# Patient Record
Sex: MALE | Race: WHITE | Employment: OTHER | URBAN - METROPOLITAN AREA
[De-identification: names, ages, dates, MRNs, and addresses within clinical notes are randomized per-mention and may not be internally consistent; named-entity substitution may affect disease eponyms.]

---

## 2017-07-12 PROBLEM — R73.01 IMPAIRED FASTING GLUCOSE: Status: ACTIVE | Noted: 2017-07-12

## 2018-03-24 ENCOUNTER — HOSPITAL ENCOUNTER (OUTPATIENT)
Dept: MRI IMAGING | Age: 73
Discharge: HOME | End: 2018-03-24
Attending: FAMILY MEDICINE
Payer: MEDICARE

## 2018-03-24 DIAGNOSIS — M54.50 CHRONIC BILATERAL LOW BACK PAIN WITHOUT SCIATICA: ICD-10-CM

## 2018-03-24 DIAGNOSIS — G89.29 CHRONIC BILATERAL LOW BACK PAIN WITHOUT SCIATICA: ICD-10-CM

## 2018-03-24 PROCEDURE — 72148 MRI LUMBAR SPINE W/O DYE: CPT

## 2018-07-05 PROBLEM — M51.36 DDD (DEGENERATIVE DISC DISEASE), LUMBAR: Status: ACTIVE | Noted: 2018-07-05

## 2018-07-05 PROBLEM — G89.4 CHRONIC PAIN SYNDROME: Status: ACTIVE | Noted: 2018-07-05

## 2018-07-18 PROBLEM — R10.13 DYSPEPSIA: Status: ACTIVE | Noted: 2018-07-18

## 2018-12-05 ENCOUNTER — HOSPITAL ENCOUNTER (OUTPATIENT)
Dept: LAB | Age: 73
Discharge: HOME | End: 2018-12-05

## 2018-12-05 PROCEDURE — 88305 TISSUE EXAM BY PATHOLOGIST: CPT

## 2019-11-21 ENCOUNTER — HOSPITAL ENCOUNTER (OUTPATIENT)
Dept: ULTRASOUND IMAGING | Age: 74
Discharge: HOME | End: 2019-11-21
Attending: FAMILY MEDICINE

## 2019-11-21 DIAGNOSIS — M79.604 RIGHT LEG PAIN: ICD-10-CM

## 2020-07-28 ENCOUNTER — HOSPITAL ENCOUNTER (OUTPATIENT)
Dept: MRI IMAGING | Age: 75
Discharge: HOME | End: 2020-07-28
Attending: ANESTHESIOLOGY
Payer: MEDICARE

## 2020-07-28 DIAGNOSIS — M54.50 LOW BACK PAIN: ICD-10-CM

## 2020-07-28 DIAGNOSIS — M54.10 RADICULOPATHY: ICD-10-CM

## 2020-07-28 PROCEDURE — 72148 MRI LUMBAR SPINE W/O DYE: CPT

## 2020-08-20 PROBLEM — M53.86 SCIATICA ASSOCIATED WITH DISORDER OF LUMBAR SPINE: Status: ACTIVE | Noted: 2020-08-20

## 2021-07-01 ENCOUNTER — HOSPITAL ENCOUNTER (OUTPATIENT)
Dept: PHYSICAL THERAPY | Age: 76
Discharge: HOME | End: 2021-07-01
Payer: MEDICARE

## 2021-07-01 DIAGNOSIS — M17.11 PRIMARY OSTEOARTHRITIS OF RIGHT KNEE: ICD-10-CM

## 2021-07-01 PROCEDURE — 97110 THERAPEUTIC EXERCISES: CPT

## 2021-07-01 PROCEDURE — 97161 PT EVAL LOW COMPLEX 20 MIN: CPT

## 2021-07-01 NOTE — PROGRESS NOTES
Roi Moctezuma II  : 1945  Primary: Calixto Barrett Medicare Advantage  Secondary:  [address] at [facility]  [address], [address], [place] [address]  [phone]   [phone]                                  OUTPATIENT PHYSICAL THERAPY: Daily Treatment Note 2021  Visit Count:  1  ICD-10: Treatment Diagnosis: unilateral primary OA right knee (M17.11)  Pain in joint, right knee (M25.561)  Precautions/Allergies:   Codeine, Lisinopril, and Ultram [tramadol]   TREATMENT PLAN:  Effective Dates: 2021 TO 2021 (30 days). Frequency/Duration: 2 times a week for 30 Day(s)     MEDICAL/REFERRING DIAGNOSIS:  Primary osteoarthritis of right knee [M17.11]   DATE OF ONSET: chronic  REFERRING PHYSICIAN: Jesús Pedro MD MD Orders: Ovidio Rosado and Treat  Return MD Appointment: 21     Pre-treatment Symptoms/Complaints:  Rio Moctezuma II reports chronic stiffness and pain in right knee. Possible right TKA 21 so we will need to mindful of Medicare. Pain: Initial: Pain Intensity 1: 6 (1 at rest   6 with walking)  Pain Location 1: Knee  Pain Orientation 1: Right  Post Session:  6/10   Medications Last Reviewed:  2021  Updated Objective Findings:     TREATMENT:   THERAPEUTIC EXERCISE: (15 minutes):  Exercises per grid below to improve mobility, strength and coordination. Required minimal verbal cues to promote proper body mechanics. Progressed resistance, range and repetitions as indicated. Access Code: [email or web address]  URL: [email or web address]  Date: 2021  Prepared by:  Jaime Alatorre    Exercises  Supine Quad Set - 2 x daily - 10 reps - 5 hold  Supine Hip Adduction Isometric with Ball - 2 x daily - 10 reps - 5 hold  Supine Active Straight Leg Raise - 2 x daily - 10 reps  Supine Hamstring Stretch - 2 x daily - 1 sets - 10 reps  Sidelying Hip Abduction - 2 x daily - 10 reps - 5 hold     Date:  7-1-21 Date:   Date:     Activity/Exercise Parameters Parameters Parameters   HEP As above                                           Treatment/Session Summary:    · Response to Treatment: Dariana Briseyda II demonstrates good understanding of initial HEP. Also instructed in supine piriformis for left sided sciatica  · Communication/Consultation:  HEP  · Equipment provided today:  instruction sheet  · Recommendations/Intent for next treatment session: Next visit will focus on progressive right knee stabilization. .    Total Treatment Billable Duration:  15 min therex  PT Patient Time In/Time Out  Time In: 1000  Time Out: 4800 \[facility] [place] [facility]\"", PT    Future Appointments   Date Time Provider Jordan Dulce   7/7/2021  1:45 PM Raya Price, PT, DPT [facility]   7/9/2021  8:00 AM Juan Pablo Schmid, PT, DPT [facility]   7/12/2021  9:30 AM Juan Pablo Werner, PT, DPT [facility]   7/14/2021  9:45 AM Raya Price PT, DPT [facility]   7/19/2021  1:45 PM Geralynn Capes, PT [facility]   7/21/2021 10:30 AM Geralynn Capes, PT [facility]   7/26/2021  1:45 PM Geralynn Capes, PT [facility]   7/28/2021 10:30 AM Geralynn Capes, PT [facility]   8/2/2021 10:30 AM Geralynn Capes, PT [facility]   8/4/2021 11:15 AM Geralynn Capes, PT [facility]   5/18/2022  9:40 AM Tanesha Arechiga, GEMA [facility]

## 2021-07-02 NOTE — THERAPY EVALUATION
Mikel Tatum II  : 1945  Primary: Bea Pichardo Medicare Advantage  Secondary:  [address]  at [facility]  [address] , [address], [place] [address]  [phone]   [phone]        OUTPATIENT PHYSICAL THERAPY:Initial Assessment 2021    ICD-10: Treatment Diagnosis: unilateral primary OA right knee (M17.11)  Pain in joint, right knee (M25.561)  Precautions/Allergies:   Codeine, Lisinopril, and Ultram [tramadol]   TREATMENT PLAN:  Effective Dates: 2021 TO 2021 (30 days). Frequency/Duration: 2 times a week for 30 Day(s)     MEDICAL/REFERRING DIAGNOSIS:  Primary osteoarthritis of right knee [M17.11]   DATE OF ONSET: chronic  REFERRING PHYSICIAN: Rom Villalobos MD MD Orders: Merrill Cantu and Treat  Return MD Appointment: 21     INITIAL ASSESSMENT:  Mr. Steven Tatum presents with complaints of chronic pain in his right knee. He states that walking and stair climbing are becoming progressively difficult. Pain improves with rest. Patient is scheduled for probable right TKA 21. Patient does note that he was diagnosed with COVID 19 in February, and was hospitalized 3 days. He noted significant decrease in leg strength after discharge, and reports \"melting\" to the floor x 4 over 2 days due to leg weakness. Pt may benefit from PT to address the following problem list.   PROBLEM LIST (Impacting functional limitations):  1. Decreased Strength  2. Decreased ADL/Functional Activities  3. Increased Pain  4. Decreased Flexibility/Joint Mobility INTERVENTIONS PLANNED:  1. Cryotherapy  2. Electrical Stimulation  3. Home Exercise Program (HEP)  4. Manual Therapy  5. Therapeutic Exercise/Strengthening     GOALS: (Goals have been discussed and agreed upon with patient.)  Short-Term Functional Goals: Time Frame: 2 weeks  1. Independent in initial HEP  2. Decrease pain with walking to < 5/10  Discharge Goals: Time Frame: 4 weeks  1. Independent in advanced HEP  2.  Decrease pain at rest to 1/10  3. Maintain full extension right knee    NOTE: Patient scheduled for TKA 8-9-21. Please monitor Medicare usage. CLINICAL DECISION MAKING:   Outcome Measure: Tool Used: Lower Extremity Functional Scale (LEFS)  Score:  Initial: 34/80   7-1-21 Most Recent: X/80 (Date: -- )   Interpretation of Score: 20 questions each scored on a 5 point scale with 0 representing \"extreme difficulty or unable to perform\" and 4 representing \"no difficulty\". The lower the score, the greater the functional disability. 80/80 represents no disability. Minimal detectable change is 9 points. Medical Necessity:   · Patient demonstrates good rehab potential due to higher previous functional level. Reason for Services/Other Comments:  · Patient continues to require modification of therapeutic interventions to increase complexity of exercises. PT Patient Time In/Time Out  Time In: 1000  Time Out: 1045  Rehabilitation Potential For Stated Goals: Good  Regarding Pengilly Rula BERRIOS's therapy, I certify that the treatment plan above will be carried out by a therapist or under their direction. Thank you for this referral,  Iram Romero, PT                 The information in this section was collected on 7-1-21 (except where otherwise noted). HISTORY:   History of Present Injury/Illness (Reason for Referral):  Chronic right knee pain  Past Medical History/Comorbidities:   Mr. Mani Reyes  has a past medical history of Cervicalgia (5/14/2013), Chronic LBP (9/25/2013), CKD (chronic kidney disease) stage 3, GFR 30-59 ml/min ([facility]) (4/12/2016), Combined hyperlipidemia, DDD (degenerative disc disease), cervical, Dysmetabolic syndrome X (5/53/9140), GERD (gastroesophageal reflux disease), GERD (gastroesophageal reflux disease), Gout (5/14/2013), HTN (hypertension), Low HDL (under 40), Migraine, NAFLD (nonalcoholic fatty liver disease), Obesity (5/14/2013), PRATIBHA (obstructive sleep apnea), and Prediabetes (2012).    Bradley Mcintosh  has a past surgical history that includes hx cervical diskectomy (2004); hx lap cholecystectomy (2005); hx tonsillectomy (1951); hx cholecystectomy (2005); hx colonoscopy; hx endoscopy (2004); and hx lumbar laminectomy (1/2015). Social History/Living Environment:     denies barriers  Prior Level of Function/Work/Activity:  Works part time  Dominant Side:         RIGHT     Ambulatory/Rehab [facility] Risk Assessment    Risk Factors:       (1)  Gender [Male] Ability to Rise from Chair:       (1)  Pushes up, successful in one attempt    Falls Prevention Plan:       No modifications necessary   Total: (5 or greater = High Risk): 2    ©2010 [facility] [address] [place] [address] Patent #[id number]. Federal Law prohibits the replication, distribution or use without written permission from [facility] [employer]       Current Medications:       Current Outpatient Medications:     pantoprazole (PROTONIX) 40 mg tablet, 1 cap po qd, Disp: 90 Tab, Rfl: 1    HYDROcodone-acetaminophen (NORCO) 5-325 mg per tablet, 1 tab po bid prn pain, Disp: 60 Tab, Rfl: 0    HYDROcodone-acetaminophen (NORCO) 5-325 mg per tablet, 1 tab po bid prn pain, Disp: 60 Tab, Rfl: 0    torsemide (DEMADEX) 10 mg tablet, 1 tab po qam prn edema, Disp: 90 Tab, Rfl: 3    allopurinol (ZYLOPRIM) 300 mg tablet, 1 tab po qd, Disp: 90 Tab, Rfl: 1    gabapentin (NEURONTIN) 300 mg capsule, Take 1 Cap by mouth three (3) times daily. , Disp: 270 Cap, Rfl: 1    losartan (COZAAR) 100 mg tablet, TAKE 1 TABLET BY MOUTH EVERY DAY, Disp: 90 Tab, Rfl: 1    potassium chloride (KLOR-CON) 10 mEq tablet, Take 2 Tabs by mouth daily. , Disp: 180 Tab, Rfl: 1    HYDROcodone-acetaminophen (NORCO) 5-325 mg per tablet, Take 1 Tab by mouth two (2) times daily as needed for Pain.  Max Daily Amount: 2 Tabs., Disp: 60 Tab, Rfl: 0    atorvastatin (LIPITOR) 10 mg tablet, Take 1 Tab by mouth nightly., Disp: 90 Tab, Rfl: 1    doxazosin (CARDURA) 4 mg tablet, Take 1 Tab by mouth nightly. (Patient not taking: Reported on 5/20/2021), Disp: 90 Tab, Rfl: 1    ibuprofen (MOTRIN) 200 mg tablet, Take 800 mg by mouth every eight (8) hours as needed for Pain. (Patient not taking: Reported on 5/20/2021), Disp: , Rfl:     Omega-3 Fatty Acids 60- mg cpDR, Take  by mouth., Disp: , Rfl:     coenzyme q10 10 mg cap, Take  by mouth., Disp: , Rfl:     cpap machine kit, by Does Not Apply route. 13 cm with water, Disp: , Rfl:     OTHER, (Compounded Cream) Gabapentin 4%, Topiramate 2.5%, Lidocaine 2%, Prilocaine 2%,  Apply 1-2 pumps of pain cream to faocal pain area 2 times daily, Disp: , Rfl:     aspirin delayed-release 81 mg tablet, Take 81 mg by mouth daily. Last dose 12/28/14  Indications: MYOCARDIAL INFARCTION PREVENTION (Patient not taking: Reported on 5/20/2021), Disp: , Rfl:     KRILL OIL PO, Take 1 tablet by mouth daily. Last dose 12/29/14, Disp: , Rfl:     cetirizine (ZYRTEC) 10 mg tablet, Take  by mouth every morning. Indications: ALLERGIC RHINITIS, Disp: , Rfl:     multivitamin (ONE A DAY) tablet, Take 1 tablet by mouth daily. Last dose 12/29/14  Indications: VITAMIN DEFICIENCY PREVENTION, Disp: , Rfl:    Date Last Reviewed:  7/1/2021   Number of Personal Factors/Comorbidities that affect the Plan of Care: 0: LOW COMPLEXITY   EXAMINATION:   Observation/Orthostatic Postural Assessment:          Mild edema right knee  Palpation:          Moderate tenderness medial joint line right knee  ROM:            Knee ROM  DATE  7-1-21 DATE     flexion  R:125  L:128 R:  L:   extension R:-2  L:0 R:  L:       Strength:            Knee strength  DATE  7-1-21 DATE     flexion  R:4+  L:4+ R:  L:   extension R:4+  L:% R:  L:      Body Structures Involved:  1. Bones  2. Joints  3. Muscles Body Functions Affected:  1. Movement Related Activities and Participation Affected:  1.  General Tasks and Demands   Number of elements (examined above) that affect the Plan of Care: 1-2: LOW COMPLEXITY   CLINICAL PRESENTATION:   Presentation: Stable and uncomplicated: LOW COMPLEXITY      Use of outcome tool(s) and clinical judgement create a POC that gives a: Clear prediction of patient's progress: LOW COMPLEXITY                    Treatment/Session Assessment:    · Response to Treatment:  Pt reports understanding of and agreement with treatment plan. · Compliance with Program/Exercises: Will assess as treatment progresses. · Recommendations/Intent for next treatment session: \"Next visit will focus on advancements to more challenging activities\". Patient is initially scheduled for 2 x week. If progressing well, may decrease frequency to 1 x week. Future Appointments   Date Time Provider Jordan Cardona   7/7/2021  1:45 PM Bill Nick, PT, DPT [facility]   7/9/2021  8:00 AM Cyndi Schmid, PT, DPT [facility]   7/12/2021  9:30 AM Arletzhen Marti, PT, DPT [facility]   7/14/2021  9:45 AM Bill Nick, PT, DPT [facility]   7/19/2021  1:45 PM Jeannetta Inocencia, PT [facility]   7/21/2021 10:30 AM Jeannetta Inocencia, PT [facility]   7/26/2021  1:45 PM Jeannetta Inocencia, PT [facility]   7/28/2021 10:30 AM Jeannetta Inocencia, PT [facility]   8/2/2021 10:30 AM Jeannetta Inocencia, PT [facility]   8/4/2021 11:15 AM Jeannetta Inocencia, PT [facility]   5/18/2022  9:40 AM Carmen Arechiga, GEMA [facility]     Please explain any variance from Plan of Care.   Total Treatment Duration: 20 min debra Sequeira, PT

## 2021-07-07 ENCOUNTER — HOSPITAL ENCOUNTER (OUTPATIENT)
Dept: PHYSICAL THERAPY | Age: 76
Discharge: HOME | End: 2021-07-07
Payer: MEDICARE

## 2021-07-07 PROCEDURE — 97110 THERAPEUTIC EXERCISES: CPT

## 2021-07-12 ENCOUNTER — HOSPITAL ENCOUNTER (OUTPATIENT)
Dept: PHYSICAL THERAPY | Age: 76
Discharge: HOME | End: 2021-07-12
Payer: MEDICARE

## 2021-07-12 PROCEDURE — 97016 VASOPNEUMATIC DEVICE THERAPY: CPT

## 2021-07-12 PROCEDURE — 97110 THERAPEUTIC EXERCISES: CPT

## 2021-07-12 NOTE — PROGRESS NOTES
Andria Moore [facility] Deepti   : 1945  Primary: Travis Knutson Medicare Advantage  Secondary:  [address]  at [facility]  [address], [address], [place] [address]  [phone]   [phone]                                  OUTPATIENT PHYSICAL THERAPY: Daily Treatment Note 2021  Visit Count:  3  ICD-10: Treatment Diagnosis: unilateral primary OA right knee (M17.11)  Pain in joint, right knee (M25.561)  Precautions/Allergies:   Codeine, Lisinopril, and Ultram [tramadol]   TREATMENT PLAN:  Effective Dates: 2021 TO 2021 (30 days). Frequency/Duration: 2 times a week for 30 Day(s)     MEDICAL/REFERRING DIAGNOSIS:  Primary osteoarthritis of right knee [M17.11]   DATE OF ONSET: chronic  REFERRING PHYSICIAN: Que Godoy MD MD Orders: Mac Solon and Treat  Return MD Appointment: 21     Pre-treatment Symptoms/Complaints:  Kevin Massey II reported to PT with minimal R knee pain. Notes that he is able to avoid most aggravating activities with exception of donning shoes. Pain: Initial:    Post Session:  1/10   Medications Last Reviewed:  2021  Updated Objective Findings:  None today   TREATMENT:   THERAPEUTIC EXERCISE: (45 minutes):  Exercises per grid below to improve mobility, strength and coordination. Required minimal verbal cues to promote proper body mechanics. Progressed resistance, range and repetitions as indicated. Access Code: [email or web address]  URL: [email or web address]  Date: 2021  Prepared by:  Jaime Alatorre    Exercises  Supine Quad Set - 2 x daily - 10 reps - 5 hold  Supine Hip Adduction Isometric with Ball - 2 x daily - 10 reps - 5 hold  Supine Active Straight Leg Raise - 2 x daily - 10 reps  Supine Hamstring Stretch - 2 x daily - 1 sets - 10 reps  Sidelying Hip Abduction - 2 x daily - 10 reps - 5 hold     Date:  21 Date:  21 Date:  21 Activity/Exercise Parameters Parameters Parameters   HEP As above  Just supine quad set   SAQ  10x, 3 sets, 5# 10x, 3 sets, 5#   Heel slides  10x, 3 sets, manual resistance in push and pull 10x, 3 sets, manual resistance in push and pull   Shuttle  10x: 1 set 100 lbs, 2 [id number]# Bilateral 3 X 10, 125#; Unilateral RLE 2 X 10, 75#    Heel Raises  10x, 3 sets, low box    Nustep  7' L1 8' L2   Hamstring Curls   Standing, RLE 3 X 10, 5#   LAQ   RLE 3 X 10, 5#   Sit to Stand   3 X 10   Step ups   Front and side; 3 X 10 each - mid sized step   Hip Abduction   Standing, RLE 3 X 10     Modalities: 15 min Game Ready R knee    Treatment/Session Summary:    · Response to Treatment: Elaine Hugo II tolerated treatment well with minimal symptomatic pain. He began to feel tenderness in the inferior pole of the R patella w/ quad sets and with front step ups. · Communication/Consultation:    · Equipment provided today:    · Recommendations/Intent for next treatment session: Next visit will focus on progressive right knee stabilization. .    Total Treatment Billable Duration:  45 min therex; 15 min vasopneumatic constriction  PT Patient Time In/Time Out  Time In: 0930  Time Out: 1030  Yahaira Beebe, PT, DPT    Future Appointments   Date Time Provider Jordan Cardona   7/14/2021  9:45 AM Zach Espinoza PT, DPT [facility]   7/19/2021  1:45 PM Rosemarie Cat, PT [facility]   7/21/2021 10:30 AM Rosemarie Cat, PT [facility]   7/26/2021  9:00 AM SFE ASSESSMENT RM 06 [facility]   7/26/2021  1:45 PM Rosemarie Stephania, PT [facility]   7/28/2021 10:30 AM Rosemariecastillo Cat, PT [facility]   8/2/2021 10:30 AM Rosemarie Stephania, PT [facility]   8/4/2021 11:15 AM Rosemarie Stephania, PT [facility]   5/18/2022  9:40 AM Candelario Arechiga, NP [facility]

## 2021-07-14 ENCOUNTER — HOSPITAL ENCOUNTER (OUTPATIENT)
Dept: PHYSICAL THERAPY | Age: 76
Discharge: HOME | End: 2021-07-14
Payer: MEDICARE

## 2021-07-14 PROCEDURE — 97110 THERAPEUTIC EXERCISES: CPT

## 2021-07-14 NOTE — PROGRESS NOTES
Francisco Javier Lundberg II  : 1945  Primary: Jesusita Or Medicare Advantage  Secondary:  [address]  at [facility]  [address], [address], [place] [address]  [phone]   [phone]                                  OUTPATIENT PHYSICAL THERAPY: Daily Treatment Note 2021  Visit Count:  4  ICD-10: Treatment Diagnosis: unilateral primary OA right knee (M17.11)  Pain in joint, right knee (M25.561)  Precautions/Allergies:   Codeine, Lisinopril, and Ultram [tramadol]   TREATMENT PLAN:  Effective Dates: 2021 TO 2021 (30 days). Frequency/Duration: 2 times a week for 30 Day(s)     MEDICAL/REFERRING DIAGNOSIS:  Primary osteoarthritis of right knee [M17.11]   DATE OF ONSET: chronic  REFERRING PHYSICIAN: Charlotte Rios MD MD Orders: Nolberto Rey and Treat  Return MD Appointment: 21     Pre-treatment Symptoms/Complaints:  Francisco Javier Lundberg II reported feeling mostly pain free and experiences symptoms if he squats. Pain: Initial: Pain Intensity 1: 1  Post Session:  1/10   Medications Last Reviewed:  2021  Updated Objective Findings:  None today   TREATMENT:   THERAPEUTIC EXERCISE: (40 minutes):  Exercises per grid below to improve mobility, strength and coordination. Required minimal verbal cues to promote proper body mechanics. Progressed resistance, range and repetitions as indicated. Access Code: [email or web address]  URL: [email or web address]  Date: 2021  Prepared by:  Jaime Alatorre    Exercises  Supine Quad Set - 2 x daily - 10 reps - 5 hold  Supine Hip Adduction Isometric with Ball - 2 x daily - 10 reps - 5 hold  Supine Active Straight Leg Raise - 2 x daily - 10 reps  Supine Hamstring Stretch - 2 x daily - 1 sets - 10 reps  Sidelying Hip Abduction - 2 x daily - 10 reps - 5 hold     Date:  21 Date:  21 Date:  21 Date:  21   Activity/Exercise Parameters Parameters Parameters    HEP As above  Just supine quad set Discussed HEP   SAQ  10x, 3 sets, 5# 10x, 3 sets, 5#    Heel slides  10x, 3 sets, manual resistance in push and pull 10x, 3 sets, manual resistance in push and pull    Shuttle  10x: 1 set 100 lbs, 2 [id number]# Bilateral 3 X 10, 125#; Unilateral RLE 2 X 10, 75#     Heel Raises  10x, 3 sets, low box  10x  1 set, flat; 2 sets on wedge   Nustep  7' L1 8' L2 6' L2   Hamstring Curls   Standing, RLE 3 X 10, 5#    LAQ   RLE 3 X 10, 5#    Sit to Stand   3 X 10    Step ups   Front and side; 3 X 10 each - mid sized step 10x, 2 sets, 6.5\"    Hip Abduction   Standing, RLE 3 X 10    SLR    10x, 1 sets, 2 sets 5#   Knee curls    10x, 2 sets   Hip bridge    10x, 3 sets, manual abduction resistance     Modalities: 10 min Ice pack on R knee-  Not Billed    Treatment/Session Summary:    · Response to Treatment: Aries Horta II tolerated treatment well with minimal symptomatic pain. He began to feel unsteadiness during the step-ups  · Communication/Consultation:  None today  · Equipment provided today:  None today  · Recommendations/Intent for next treatment session: Next visit will focus on progressive right knee stabilization. .    Total Treatment Billable Duration:  40 min therex; 10 min ice pack (not billed)  PT Patient Time In/Time Out  Time In: 0945  Time Out: 29[address] [facility]    Future Appointments   Date Time Provider Jordan Cardona   7/19/2021  1:45 PM KIRSTIN Strauss   7/21/2021 10:30 AM Shanice Pineda PT [facility]   7/26/2021  9:00 AM [facility]FE ASSESSMENT RM 06 [facility]   7/26/2021  1:45 PM Shanice Pineda PT [facility]   7/28/2021 10:30 AM Shanice Pineda PT [facility]   7/28/2021  2:00 PM CAROL Walker   8/2/2021 10:30 AM Shanice Pineda PT [facility]   8/4/2021 11:15 AM Shanice Pineda PT [facility]   9/9/2021 10:30 AM Lucy Beauchamp MD [facility]   5/18/2022  9:40 AM Speach, Severa Pedlar, NP [facility]

## 2021-07-19 ENCOUNTER — HOSPITAL ENCOUNTER (OUTPATIENT)
Dept: PHYSICAL THERAPY | Age: 76
Discharge: HOME | End: 2021-07-19
Payer: MEDICARE

## 2021-07-19 PROCEDURE — 97110 THERAPEUTIC EXERCISES: CPT

## 2021-07-20 NOTE — PROGRESS NOTES
Job Spencer II  : 1945  Primary: Catia Drummond Medicare Advantage  Secondary:  [address]  at [facility]  [address], [address], [place]  [phone]   [phone]                                  OUTPATIENT PHYSICAL THERAPY: Daily Treatment Note 2021  Visit Count:  5  ICD-10: Treatment Diagnosis: unilateral primary OA right knee (M17.11)  Pain in joint, right knee (M25.561)  Precautions/Allergies:   Codeine, Lisinopril, and Ultram [tramadol]   TREATMENT PLAN:  Effective Dates: 2021 TO 2021 (30 days). Frequency/Duration: 2 times a week for 30 Day(s)     MEDICAL/REFERRING DIAGNOSIS:  Primary osteoarthritis of right knee [M17.11]   DATE OF ONSET: chronic  REFERRING PHYSICIAN: Abler Shepard MD MD Orders: Blanquita Veras and Treat  Return MD Appointment: 21     Pre-treatment Symptoms/Complaints:  Job Spencer II reported knee pain now mainly with rotational movement  Pain: Initial: Pain Intensity 1: 1  Pain Location 1: Knee  Pain Orientation 1: Right  Post Session:  1/10   Medications Last Reviewed:  2021  Updated Objective Findings:  None today   TREATMENT:   THERAPEUTIC EXERCISE: (40 minutes):  Exercises per grid below to improve mobility, strength and coordination. Required minimal verbal cues to promote proper body mechanics. Progressed resistance, range and repetitions as indicated. Access Code: [email or web address]  URL: [email or web address]  Date: 2021  Prepared by:  Jaime Alatorre    Exercises  Supine Quad Set - 2 x daily - 10 reps - 5 hold  Supine Hip Adduction Isometric with Ball - 2 x daily - 10 reps - 5 hold  Supine Active Straight Leg Raise - 2 x daily - 10 reps  Supine Hamstring Stretch - 2 x daily - 1 sets - 10 reps  Sidelying Hip Abduction - 2 x daily - 10 reps - 5 hold     Date:  21 Date:  21 Date:  21 Date:  21 Date:  7/19/21   Activity/Exercise Parameters Parameters Parameters     HEP As above  Just supine quad set Discussed HEP    SAQ  10x, 3 sets, 5# 10x, 3 sets, 5#  5# x 10   Heel slides  10x, 3 sets, manual resistance in push and pull 10x, 3 sets, manual resistance in push and pull     Shuttle  10x: 1 set 100 lbs, 2 [id number]# Bilateral 3 X 10, 125#; Unilateral RLE 2 X 10, 75#      Heel Raises  10x, 3 sets, low box  10x  1 set, flat; 2 sets on wedge Toe/heel x 10 // bars   Nustep  7' L1 8' L2 6' L2 Level 2 x 10'   Hamstring Curls   Standing, RLE 3 X 10, 5#  5# x 10  // bars   LAQ   RLE 3 X 10, 5#     Sit to Stand   3 X 10     Step ups   Front and side; 3 X 10 each - mid sized step 10x, 2 sets, 6.5\"  Ant x 10 BLE  Lat x 10 BLE  Ant heel tap   X 10 BLE   Hip Abduction   Standing, RLE 3 X 10  5# x 10  // bars   SLR    10x, 1 sets, 2 sets 5# 5# x 10   Knee curls    10x, 2 sets    Hip bridge    10x, 3 sets, manual abduction resistance      Modalities: 0 min Ice pack on R knee-  Not Billed    Treatment/Session Summary:    · Response to Treatment: Jens Jordan II tolerated treatment well with minimal symptomatic pain. Reduce to 1x week until surgery date. · Communication/Consultation:  None today  · Equipment provided today:  None today  · Recommendations/Intent for next treatment session: Next visit will focus on progressive right knee stabilization. .    Total Treatment Billable Duration:  40 min therex  PT Patient Time In/Time Out  Time In: 9244  Time Out: 1940  Clair Triplett PT    Future Appointments   Date Time Provider Jordan Cardona   7/26/2021  7:30 AM JOINT CAMP THERAPIST 1 SFEORPT E   7/26/2021  9:00 AM SFE ASSESSMENT RM 06 SFEORPA E   7/26/2021  1:45 PM Layla Alatorre, PT [facility]   7/28/2021  2:00 PM Claudell Matter, PA [facility]   8/2/2021 10:30 AM Iesha Aguero, PT [facility]   9/9/2021 10:30 AM Angel Monroe MD [facility]   5/18/2022  9:40 AM Kerrie Arechiga, NP [facility] PSCD PP

## 2021-07-21 ENCOUNTER — APPOINTMENT (OUTPATIENT)
Dept: PHYSICAL THERAPY | Age: 76
End: 2021-07-21
Payer: MEDICARE

## 2021-07-26 ENCOUNTER — HOSPITAL ENCOUNTER (OUTPATIENT)
Dept: SURGERY | Age: 76
Discharge: HOME | End: 2021-07-26
Payer: MEDICARE

## 2021-07-26 ENCOUNTER — HOSPITAL ENCOUNTER (OUTPATIENT)
Dept: PHYSICAL THERAPY | Age: 76
Discharge: HOME | End: 2021-07-26
Payer: MEDICARE

## 2021-07-26 LAB
ANION GAP SERPL CALC-SCNC: 6 MMOL/L (ref 7–16)
APTT PPP: 33.7 SEC (ref 24.1–35.1)
BACTERIA SPEC CULT: NORMAL
BASOPHILS # BLD: 0 K/UL (ref 0–0.2)
BASOPHILS NFR BLD: 0 % (ref 0–2)
BUN SERPL-MCNC: 23 MG/DL (ref 8–23)
CALCIUM SERPL-MCNC: 9.9 MG/DL (ref 8.3–10.4)
CHLORIDE SERPL-SCNC: 104 MMOL/L (ref 98–107)
CO2 SERPL-SCNC: 28 MMOL/L (ref 21–32)
CREAT SERPL-MCNC: 1.56 MG/DL (ref 0.8–1.5)
DIFFERENTIAL METHOD BLD: NORMAL
EOSINOPHIL # BLD: 0.3 K/UL (ref 0–0.8)
EOSINOPHIL NFR BLD: 3 % (ref 0.5–7.8)
ERYTHROCYTE [DISTWIDTH] IN BLOOD BY AUTOMATED COUNT: 14.1 % (ref 11.9–14.6)
EST. AVERAGE GLUCOSE BLD GHB EST-MCNC: 123 MG/DL
GLUCOSE SERPL-MCNC: 127 MG/DL (ref 65–100)
HBA1C MFR BLD: 5.9 % (ref 4.2–6.3)
HCT VFR BLD AUTO: 44.7 % (ref 41.1–50.3)
HGB BLD-MCNC: 14.4 G/DL (ref 13.6–17.2)
IMM GRANULOCYTES # BLD AUTO: 0 K/UL (ref 0–0.5)
IMM GRANULOCYTES NFR BLD AUTO: 0 % (ref 0–5)
INR PPP: 1
LYMPHOCYTES # BLD: 2.4 K/UL (ref 0.5–4.6)
LYMPHOCYTES NFR BLD: 27 % (ref 13–44)
MCH RBC QN AUTO: 30.5 PG (ref 26.1–32.9)
MCHC RBC AUTO-ENTMCNC: 32.2 G/DL (ref 31.4–35)
MCV RBC AUTO: 94.7 FL (ref 79.6–97.8)
MONOCYTES # BLD: 0.8 K/UL (ref 0.1–1.3)
MONOCYTES NFR BLD: 8 % (ref 4–12)
NEUTS SEG # BLD: 5.6 K/UL (ref 1.7–8.2)
NEUTS SEG NFR BLD: 61 % (ref 43–78)
NRBC # BLD: 0 K/UL (ref 0–0.2)
PLATELET # BLD AUTO: 214 K/UL (ref 150–450)
PMV BLD AUTO: 11.4 FL (ref 9.4–12.3)
POTASSIUM SERPL-SCNC: 3.5 MMOL/L (ref 3.5–5.1)
PROTHROMBIN TIME: 13.4 SEC (ref 12.5–14.7)
RBC # BLD AUTO: 4.72 M/UL (ref 4.23–5.6)
SERVICE CMNT-IMP: NORMAL
SODIUM SERPL-SCNC: 138 MMOL/L (ref 136–145)
WBC # BLD AUTO: 9.1 K/UL (ref 4.3–11.1)

## 2021-07-26 PROCEDURE — 36415 COLL VENOUS BLD VENIPUNCTURE: CPT

## 2021-07-26 PROCEDURE — 97110 THERAPEUTIC EXERCISES: CPT

## 2021-07-26 PROCEDURE — 94760 N-INVAS EAR/PLS OXIMETRY 1: CPT

## 2021-07-26 PROCEDURE — 85025 COMPLETE CBC W/AUTO DIFF WBC: CPT

## 2021-07-26 PROCEDURE — 85730 THROMBOPLASTIN TIME PARTIAL: CPT

## 2021-07-26 PROCEDURE — 77030027138 HC INCENT SPIROMETER -A

## 2021-07-26 PROCEDURE — 83036 HEMOGLOBIN GLYCOSYLATED A1C: CPT

## 2021-07-26 PROCEDURE — 85610 PROTHROMBIN TIME: CPT

## 2021-07-26 PROCEDURE — 87641 MR-STAPH DNA AMP PROBE: CPT

## 2021-07-26 PROCEDURE — 80048 BASIC METABOLIC PNL TOTAL CA: CPT

## 2021-07-26 PROCEDURE — 97161 PT EVAL LOW COMPLEX 20 MIN: CPT

## 2021-07-26 RX ORDER — CEFAZOLIN SODIUM/WATER 2 G/20 ML
2 SYRINGE (ML) INTRAVENOUS ONCE
Status: CANCELLED | OUTPATIENT
Start: 2021-07-26 | End: 2021-07-26

## 2021-07-26 RX ORDER — FLUTICASONE PROPIONATE 0.5 MG/G
1 CREAM TOPICAL 2 TIMES DAILY
COMMUNITY

## 2021-07-26 RX ORDER — LIDOCAINE 50 MG/G
1 PATCH TOPICAL EVERY 24 HOURS
COMMUNITY
End: 2021-07-26

## 2021-07-26 RX ORDER — PANTOPRAZOLE SODIUM 20 MG/1
20 TABLET, DELAYED RELEASE ORAL
COMMUNITY

## 2021-07-26 RX ORDER — FAMOTIDINE 20 MG/1
20 TABLET, FILM COATED ORAL 2 TIMES DAILY
COMMUNITY

## 2021-07-26 RX ORDER — METFORMIN HYDROCHLORIDE 500 MG/1
500 TABLET ORAL 2 TIMES DAILY WITH MEALS
COMMUNITY

## 2021-07-26 NOTE — PERIOP NOTES
Recent Results (from the past 8 hour(s))   CBC WITH AUTOMATED DIFF    Collection Time: 07/26/21  7:38 AM   Result Value Ref Range    WBC 9.1 4.3 - 11.1 K/uL    RBC 4.72 4.23 - 5.6 M/uL    HGB 14.4 13.6 - 17.2 g/dL    HCT 44.7 41.1 - 50.3 %    MCV 94.7 79.6 - 97.8 FL    MCH 30.5 26.1 - 32.9 PG    MCHC 32.2 31.4 - 35.0 g/dL    RDW 14.1 11.9 - 14.6 %    PLATELET 594 750 - 085 K/uL    MPV 11.4 9.4 - 12.3 FL    ABSOLUTE NRBC 0.00 0.0 - 0.2 K/uL    DF AUTOMATED      NEUTROPHILS 61 43 - 78 %    LYMPHOCYTES 27 13 - 44 %    MONOCYTES 8 4.0 - 12.0 %    EOSINOPHILS 3 0.5 - 7.8 %    BASOPHILS 0 0.0 - 2.0 %    IMMATURE GRANULOCYTES 0 0.0 - 5.0 %    ABS. NEUTROPHILS 5.6 1.7 - 8.2 K/UL    ABS. LYMPHOCYTES 2.4 0.5 - 4.6 K/UL    ABS. MONOCYTES 0.8 0.1 - 1.3 K/UL    ABS. EOSINOPHILS 0.3 0.0 - 0.8 K/UL    ABS. BASOPHILS 0.0 0.0 - 0.2 K/UL    ABS. IMM.  GRANS. 0.0 0.0 - 0.5 K/UL   PROTHROMBIN TIME + INR    Collection Time: 07/26/21  7:38 AM   Result Value Ref Range    Prothrombin time 13.4 12.5 - 14.7 sec    INR 1.0     PTT    Collection Time: 07/26/21  7:38 AM   Result Value Ref Range    aPTT 33.7 24.1 - 84.0 SEC   METABOLIC PANEL, BASIC    Collection Time: 07/26/21  7:38 AM   Result Value Ref Range    Sodium 138 136 - 145 mmol/L    Potassium 3.5 3.5 - 5.1 mmol/L    Chloride 104 98 - 107 mmol/L    CO2 28 21 - 32 mmol/L    Anion gap 6 (L) 7 - 16 mmol/L    Glucose 127 (H) 65 - 100 mg/dL    BUN 23 8 - 23 MG/DL    Creatinine 1.56 (H) 0.8 - 1.5 MG/DL    GFR est AA 56 (L) >60 ml/min/1.73m2    GFR est non-AA 46 (L) >60 ml/min/1.73m2    Calcium 9.9 8.3 - 10.4 MG/DL   HEMOGLOBIN A1C WITH EAG    Collection Time: 07/26/21  7:38 AM   Result Value Ref Range    Hemoglobin A1c 5.9 4.2 - 6.3 %    Est. average glucose 123 mg/dL

## 2021-07-26 NOTE — PERIOP NOTES
Patient verified name and . Order for consent  found in EHR and matches case posting; patient verified. Type 3 surgery, joint camp assessment complete. Labs per surgeon: CBC,BMP, PT/PTT, Hgb A1c ; results Creatinine 1.56; Jake Mendoza NP made aware; other results within anesthesia guidelines, routed via fax to pcp, Dr Willam Varela and to surgeon, Dr Esa García for review. Labs per anesthesia protocol: no additional  EKG:completed 1/3/21 and within anesthesia guidelines. MRSA/MSSA swab collected; pharmacy to review and dose antibiotic as appropriate. Hospital approved surgical skin cleanser and instructions to return bottle on DOS given per hospital policy. Patient provided with handouts including Guide to Surgery, Pain Management, Hand Hygiene, Blood Transfusion Education, and [facility] Brochure. Patient answered medical/surgical history questions at their best of ability. All prior to admission medications documented in [facility]. Original medication prescription bottle not visualized during patient appointment. Patient instructed to hold all vitamins 3 weeks prior to surgery and NSAIDS 5 days prior to surgery. Patient teach back successful and patient demonstrates knowledge of instruction.

## 2021-07-26 NOTE — PERIOP NOTES
PLEASE CONTINUE TAKING ALL PRESCRIPTION MEDICATIONS UP TO THE DAY OF SURGERY UNLESS OTHERWISE DIRECTED BELOW. DISCONTINUE all vitamins and supplements 21 days prior to surgery. DISCONTINUE Non-Steriodal Anti-Inflammatory (NSAIDS) such as Advil and Aleve 5 days prior to surgery. Home Medications to take  the day of surgery    Allopurinol                  Potassium   Zyrtec                         Atorvastatin   Famotidine     Home Medications   to Hold   Hold Ibuprofen for 5 days prior to surgery        Comments    BRING CPAP   On the day before surgery please take Acetaminophen 1000mg in the morning and then again before bed. You may substitute for Tylenol 650 mg. BRING:  Famotidine, Cetirizine (Zyrtec)~in original bottle       Please do not bring home medications with you on the day of surgery unless otherwise directed by your nurse. If you are instructed to bring home medications, please give them to your nurse as they will be administered by the nursing staff. If you have any questions, please call [address] [phone] or [facility] [phone]. A copy of this note was provided to the patient for reference.

## 2021-07-26 NOTE — PROGRESS NOTES
Nicole Ryan II  : 1945  Primary: Dario Josue Medicare Advantage  Secondary:  [address] at [facility]  [address], [address], [place]  [phone]   [phone]                                  OUTPATIENT PHYSICAL THERAPY: Daily Treatment Note 2021  Visit Count:  6  ICD-10: Treatment Diagnosis: unilateral primary OA right knee (M17.11)  Pain in joint, right knee (M25.561)  Precautions/Allergies:   Codeine, Lisinopril, and Ultram [tramadol]   TREATMENT PLAN:  Effective Dates: 2021 TO 2021 (30 days). Frequency/Duration: 2 times a week for 30 Day(s)     MEDICAL/REFERRING DIAGNOSIS:  Primary osteoarthritis of right knee [M17.11]   DATE OF ONSET: chronic  REFERRING PHYSICIAN: Maribel Cantu MD MD Orders: Sarah Bare and Treat  Return MD Appointment: 21     Pre-treatment Symptoms/Complaints:  Nicole Ryan II reports attending joint camp this morning. Still scheduled for TKA 21. Pain: Initial: Pain Intensity 1: 1  Pain Location 1: Knee  Pain Orientation 1: Right  Post Session:  1/10   Medications Last Reviewed:  2021  Updated Objective Findings:  None today   TREATMENT:   THERAPEUTIC EXERCISE: (40 minutes):  Exercises per grid below to improve mobility, strength and coordination. Required minimal verbal cues to promote proper body mechanics. Progressed resistance, range and repetitions as indicated. Access Code: [email or web address]  URL: [email or web address]  Date: 2021  Prepared by:  Jaime Alatorre    Exercises  Supine Quad Set - 2 x daily - 10 reps - 5 hold  Supine Hip Adduction Isometric with Ball - 2 x daily - 10 reps - 5 hold  Supine Active Straight Leg Raise - 2 x daily - 10 reps  Supine Hamstring Stretch - 2 x daily - 1 sets - 10 reps  Sidelying Hip Abduction - 2 x daily - 10 reps - 5 hold     Date:  21 Date:  21 Date:  7/12/21 Date:  7/14/21 Date:  7/19/21 Date:  7/26/21   Activity/Exercise Parameters Parameters Parameters      HEP As above  Just supine quad set Discussed HEP     SAQ  10x, 3 sets, 5# 10x, 3 sets, 5#  5# x 10 5# x 10   Heel slides  10x, 3 sets, manual resistance in push and pull 10x, 3 sets, manual resistance in push and pull      Shuttle  10x: 1 set 100 lbs, 2 uvz198# Bilateral 3 X 10, 125#; Unilateral RLE 2 X 10, 75#       Heel Raises  10x, 3 sets, low box  10x  1 set, flat; 2 sets on wedge Toe/heel x 10 // bars 5# x 10  // bars   Nustep  7' L1 8' L2 6' L2 Level 2 x 10' Level 2 x 10'   Incline calf stretch      10 x 5\"   Hamstring Curls   Standing, RLE 3 X 10, 5#  5# x 10  // bars 5# x 10  // bars   LAQ   RLE 3 X 10, 5#   5# x 10   Sit to Stand   3 X 10      Step ups   Front and side; 3 X 10 each - mid sized step 10x, 2 sets, 6.5\"  Ant x 10 BLE  Lat x 10 BLE  Ant heel tap   X 10 BLE Ant x 10 BLE  Lat x 10 BLE  Ant heel tap   X 10 BLE   Hip Abduction   Standing, RLE 3 X 10  5# x 10  // bars 5# x 10  // bars   SLR    10x, 1 sets, 2 sets 5# 5# x 10 5# x 10   Knee curls    10x, 2 sets     Hip bridge    10x, 3 sets, manual abduction resistance       Modalities: 0 min Ice pack on R knee-  Not Billed    Treatment/Session Summary:    · Response to Treatment: Arabella Le II is scheduled for 1 additional session before surgery. · Communication/Consultation:  None today  · Equipment provided today:  None today  · Recommendations/Intent for next treatment session: Next visit will focus on progressive right knee stabilization. .    Total Treatment Billable Duration:  40 min therex  PT Patient Time In/Time Out  Time In: 1582  Time Out: 9900  Gary Hobbs PT    Future Appointments   Date Time Provider Jordan Cardona   7/28/2021  2:00 PM Gaurav Lyn, [address] SSA POAI POA   8/2/2021 10:30 AM Mark Sneed PT [facility]   9/9/2021 10:30 AM Alex Dunn MD [facility]   5/18/2022  9:40 AM Rizwan Arechiga, GEMA [facility] PSCD PP

## 2021-07-26 NOTE — ADVANCED PRACTICE NURSE
Total Joint Surgery Preoperative Chart Review      Patient ID:  Jumana Martin  [id number]  57 y.o.  1945  Surgeon: Dr. Esa García  Date of Surgery: 8/9/2021  Procedure: Total Right Knee Arthroplasty  Primary Care Physician: Burton Rascon [phone]  Specialty Physician(s):      Subjective:   Aries Horta II is a 78 y.o. WHITE/NON- male who presents for preoperative evaluation for Total Right Knee arthroplasty. This is a preoperative chart review note based on data collected by the nurse at the surgical Pre-Assessment visit.     Past Medical History:   Diagnosis Date    Allergic rhinitis     Cervicalgia 5/14/2013    Chronic LBP 9/25/2013    CKD (chronic kidney disease) stage 3, GFR 30-59 ml/min ([facility]) 04/12/2016    managed by pcp    Combined hyperlipidemia     COVID-19 01/03/2021    pneumonia~hospital admission to floor x 3 days    COVID-19 vaccine series completed 04/01/2021    Moderna    DDD (degenerative disc disease), cervical     Dysmetabolic syndrome X 8/80/3152    GERD (gastroesophageal reflux disease)     GERD (gastroesophageal reflux disease)     Gout 5/14/2013    HTN (hypertension)     Low HDL (under 40)     Migraine     hx of- last one June '14    NAFLD (nonalcoholic fatty liver disease)     Nausea & vomiting     post op nausea and vomiting    Obesity 5/14/2013    PRATIBHA (obstructive sleep apnea)     uses CPAP    Prediabetes 2012    takes Metformin; does not check sqbs daily; Hgb A1c 1/3/21:  7.2      Past Surgical History:   Procedure Laterality Date    HX CERVICAL DISKECTOMY  2004    with titanium sleeve    HX CHOLECYSTECTOMY  2005    HX COLONOSCOPY      HX ENDOSCOPY  2004    HX LAP CHOLECYSTECTOMY  2005    HX LUMBAR LAMINECTOMY  1/2015    HX TONSILLECTOMY  1951     Family History   Problem Relation Age of Onset    Hypertension Brother     Cancer Brother         Lymphoma    Colon Cancer Mother 80    Cancer Mother         Colon    Headache Mother     Heart Disease Mother     Migraines Mother     Coronary Artery Disease Father 68    Diabetes Father     Heart Disease Father     Hypertension Father     Stroke Father     Parkinsonism Brother     Hypertension Brother     Cancer Brother         lymphoma      Social History     Tobacco Use    Smoking status: Former Smoker     Packs/day: 0.25     Years: 20.00     Pack years: 5.00     Quit date: 1975     Years since quittin.0    Smokeless tobacco: Never Used    Tobacco comment: quit 40 yeras ago   Substance Use Topics    Alcohol use: Yes     Alcohol/week: 1.0 standard drinks     Types: 1 Cans of beer per week     Comment: rarely       Prior to Admission medications    Medication Sig Start Date End Date Taking? Authorizing Provider   metFORMIN (GLUCOPHAGE) 500 mg tablet Take 500 mg by mouth two (2) times daily (with meals). Indications: type 2 diabetes mellitus   Yes Provider, Historical   fluticasone propionate (CUTIVATE) 0.05 % topical cream Apply  to affected area two (2) times a day. To face for dry skin patches on temples   Yes Provider, Historical   famotidine (PEPCID) 20 mg tablet Take 20 mg by mouth two (2) times a day. Take / use AM day of surgery  per anesthesia protocols. Indications: gastroesophageal reflux disease   Yes Provider, Historical   pantoprazole (PROTONIX) 20 mg tablet Take 20 mg by mouth as needed. Indications: gastroesophageal reflux disease   Yes Provider, Historical   torsemide (DEMADEX) 10 mg tablet 1 tab po qam prn edema  Patient taking differently: Take 10 mg by mouth daily. 18  Yes Laury Crockett MD   allopurinol (ZYLOPRIM) 300 mg tablet 1 tab po qd  Patient taking differently: Take 300 mg by mouth daily. 1 tab po every day; Take / use AM day of surgery  per anesthesia protocols.   Indications: treatment to prevent acute gout attack 18  Yes Laury Crockett MD   gabapentin (NEURONTIN) 300 mg capsule Take 1 Cap by mouth three (3) times daily. Patient taking differently: Take 300 mg by mouth nightly. 2 capsules at bedtime 12/13/18  Yes Bev Omalley MD   losartan (COZAAR) 100 mg tablet TAKE 1 TABLET BY MOUTH EVERY DAY  Patient taking differently: Take 50 mg by mouth daily. TAKE 1 TABLET BY MOUTH EVERY DAY  Indications: high blood pressure 12/13/18  Yes Bev Omalley MD   potassium chloride (KLOR-CON) 10 mEq tablet Take 2 Tabs by mouth daily. Patient taking differently: Take 10 mEq by mouth daily. Take 2 tablets daily; Take / use AM day of surgery  per anesthesia protocols. Indications: prevention of low potassium in the blood 10/29/18  Yes Bev Omalley MD   HYDROcodone-acetaminophen [facility]) 5-325 mg per tablet Take 1 Tab by mouth two (2) times daily as needed for Pain. Max Daily Amount: 2 Tabs. Patient taking differently: Take 1 Tablet by mouth two (2) times daily as needed for Pain. Indications: pain 12/9/18  Yes Bev Omalley MD   atorvastatin (LIPITOR) 10 mg tablet Take 1 Tab by mouth nightly. Patient taking differently: Take 10 mg by mouth daily. Take / use AM day of surgery  per anesthesia protocols. Indications: excessive fat in the blood, high cholesterol and high triglycerides 10/10/18  Yes Bev Omalley MD   ibuprofen (MOTRIN) 200 mg tablet Take 800 mg by mouth every eight (8) hours as needed for Pain. Yes Provider, Historical   Omega-3 Fatty Acids 60- mg cpDR Take  by mouth. Yes Provider, Historical   coenzyme q10 10 mg cap Take  by mouth. Yes Provider, Historical   cpap machine kit by Does Not Apply route. 13 cm with water   Yes Provider, Historical   cetirizine (ZYRTEC) 10 mg tablet Take 10 mg by mouth two (2) times a day. Take / use AM day of surgery  per anesthesia protocols. Indications: inflammation of the nose due to an allergy   Yes Provider, Historical   multivitamin (ONE A DAY) tablet Take 1 tablet by mouth daily.  Last dose 12/29/14  Indications: VITAMIN DEFICIENCY PREVENTION   Yes Provider, Historical   HYDROcodone-acetaminophen (NORCO) 5-325 mg per tablet 1 tab po bid prn pain 2/7/19   Laury Crockett MD   HYDROcodone-acetaminophen [facility]) 5-325 mg per tablet 1 tab po bid prn pain 1/8/19   Laury Crockett MD   OTHER (Compounded Cream) Gabapentin 4%, Topiramate 2.5%, Lidocaine 2%, Prilocaine 2%,    Apply 1-2 pumps of pain cream to faocal pain area 2 times daily    Provider, Historical     Allergies   Allergen Reactions    Codeine Other (comments)     Flushed and weird feeling    Lisinopril Cough    Ultram [Tramadol] Itching, Nausea Only and Other (comments)          Objective:     Physical Exam:   Patient Vitals for the past 24 hrs:   Temp Pulse Resp BP SpO2   07/26/21 0854 98 °F (36.7 °C) 62 16 (!) 140/75 94 %       ECG:    EKG Results     Procedure 720 Value Units Date/Time    EKG, 12 LEAD, INITIAL [[id number]]     Order Status: Canceled           Data Review:   Labs:   Recent Results (from the past 24 hour(s))   CBC WITH AUTOMATED DIFF    Collection Time: 07/26/21  7:38 AM   Result Value Ref Range    WBC 9.1 4.3 - 11.1 K/uL    RBC 4.72 4.23 - 5.6 M/uL    HGB 14.4 13.6 - 17.2 g/dL    HCT 44.7 41.1 - 50.3 %    MCV 94.7 79.6 - 97.8 FL    MCH 30.5 26.1 - 32.9 PG    MCHC 32.2 31.4 - 35.0 g/dL    RDW 14.1 11.9 - 14.6 %    PLATELET 686 079 - 293 K/uL    MPV 11.4 9.4 - 12.3 FL    ABSOLUTE NRBC 0.00 0.0 - 0.2 K/uL    DF AUTOMATED      NEUTROPHILS 61 43 - 78 %    LYMPHOCYTES 27 13 - 44 %    MONOCYTES 8 4.0 - 12.0 %    EOSINOPHILS 3 0.5 - 7.8 %    BASOPHILS 0 0.0 - 2.0 %    IMMATURE GRANULOCYTES 0 0.0 - 5.0 %    ABS. NEUTROPHILS 5.6 1.7 - 8.2 K/UL    ABS. LYMPHOCYTES 2.4 0.5 - 4.6 K/UL    ABS. MONOCYTES 0.8 0.1 - 1.3 K/UL    ABS. EOSINOPHILS 0.3 0.0 - 0.8 K/UL    ABS. BASOPHILS 0.0 0.0 - 0.2 K/UL    ABS. IMM.  GRANS. 0.0 0.0 - 0.5 K/UL   PROTHROMBIN TIME + INR    Collection Time: 07/26/21  7:38 AM   Result Value Ref Range    Prothrombin time 13.4 12.5 - 14.7 sec    INR 1.0     PTT    Collection Time: 07/26/21  7:38 AM   Result Value Ref Range    aPTT 33.7 24.1 - 15.8 SEC   METABOLIC PANEL, BASIC    Collection Time: 07/26/21  7:38 AM   Result Value Ref Range    Sodium 138 136 - 145 mmol/L    Potassium 3.5 3.5 - 5.1 mmol/L    Chloride 104 98 - 107 mmol/L    CO2 28 21 - 32 mmol/L    Anion gap 6 (L) 7 - 16 mmol/L    Glucose 127 (H) 65 - 100 mg/dL    BUN 23 8 - 23 MG/DL    Creatinine 1.56 (H) 0.8 - 1.5 MG/DL    GFR est AA 56 (L) >60 ml/min/1.73m2    GFR est non-AA 46 (L) >60 ml/min/1.73m2    Calcium 9.9 8.3 - 10.4 MG/DL   HEMOGLOBIN A1C WITH EAG    Collection Time: 07/26/21  7:38 AM   Result Value Ref Range    Hemoglobin A1c 5.9 4.2 - 6.3 %    Est. average glucose 123 mg/dL   MSSA/MRSA SC BY PCR, NASAL SWAB    Collection Time: 07/26/21  8:49 AM    Specimen: Nasal swab   Result Value Ref Range    Special Requests: NASAL      Culture result:        SA target not detected. A MRSA NEGATIVE, SA NEGATIVE test result does not preclude MRSA or SA nasal colonization.          Problem List:  )  Patient Active Problem List   Diagnosis Code    Migraine without status migrainosus, not intractable T94.158    Dysmetabolic syndrome X U27.46    Morbid obesity ([facility] [address] E66.01    Hypogonadism, testicular E29.1    Low HDL (under 40) E78.6    Combined hyperlipidemia E78.2    Routine health maintenance Z00.00    Essential hypertension I10    Periodic limb movement disorder G47.61    Chronic venous insufficiency I87.2    PRATIBHA (obstructive sleep apnea) G47.33    NAFLD (nonalcoholic fatty liver disease) K76.0    Obesity, morbid, BMI 40.0-49.9 (HCC) E66.01    Chronic neck pain M54.2, G89.29    Idiopathic gout M10.00    CKD (chronic kidney disease) stage 3, GFR 30-59 ml/min ([facility]) N18.30    Chronic bilateral low back pain without sciatica M54.5, G89.29    Impaired fasting glucose R73.01    DDD (degenerative disc disease), lumbar M51.36    Chronic pain syndrome G89.4    Dyspepsia R10.13    Sciatica associated with disorder of lumbar spine M53.86       Total Joint Surgery Pre-Assessment Recommendations:           Patient is to wear home CPAP during hospitalization. Renal protocol initiated. The patient's chart will be flagged renal risk. Renal RisK Alerts:  1. Caution with use of muscle relaxants and sedatives with reduced renal function  2. Caution with total amount of narcotics used   3. Avoid morphine if patient has reduced renal function due to accumulations of the highly active metabolite, morphine-6-glucuronide, which can lead to sedation and respiratory depression  4. Avoid nephrotoxic drugs such as AMES inhibitors  5. Consider volume status monitoring in addition to Dee  6.  Ensure hand-off to hospitalist for appropriate perioperative IV fluid management      Signed By: Christiano Walter NP-KEYLA    July 26, 2021

## 2021-07-26 NOTE — PROGRESS NOTES
[address] [place] [address] Dr BERRIOS  : 2056(89 y.o.) [address] at [place] [address].  [phone]       Physical Therapy Prehab Plan of Treatment and Evaluation Summary:2021    ICD-10: Treatment Diagnosis:   · Pain in Right Knee (M25.561)  · Stiffness of Right Knee, Not elsewhere classified (M25.661)  · Difficulty in walking, Not elsewhere classified (R26.2)  Precautions/Allergies:   Codeine, Lisinopril, and Ultram [tramadol]  MEDICAL/REFERRING DIAGNOSIS:  Unilateral primary osteoarthritis, right knee [M17.11]  REFERRING PHYSICIAN: Julia Romero MD  DATE OF SURGERY: 21    Assessment:   Comments:  Pt. Plans to go home with wife who had a tka 10 years ago. He reports left sciatic pain as well. PROBLEM LIST (Impacting functional limitations):  Mr. Ken Blevins presents with the following right lower extremity(s) problems:  1. Strength  2. Range of Motion  3. Home Exercise Program  4. Pain   INTERVENTIONS PLANNED:  1. Home Exercise Program  2. Educational Discussion      TREATMENT PLAN: Effective Dates: 2021 TO 2021. Frequency/Duration: Patient to continue to perform home exercise program at least twice per day up until his surgery. GOALS: (Goals have been discussed and agreed upon with patient.)  Discharge Goals: Time Frame: 1 Day  1. Patient will demonstrate independence with a home exercise program designed to increase strength, range of motion and pain control to minimize functional deficits and optimize patient for total joint replacement. Rehabilitation Potential For Stated Goals: Good  Regarding [address] [place] [address] Dr BERRIOS's therapy, I certify that the treatment plan above will be carried out by a therapist or under their direction.   Thank you for this referral,  Aster Chapman, PT               HISTORY:   Present Symptoms:  Pain Intensity 1:  (4 at worst)  Pain Location 1: Knee   History of Present Injury/Illness (Reason for Referral):  Medical/Referring Diagnosis: Unilateral primary osteoarthritis, right knee [M17.11]   Past Medical History/Comorbidities:   Mr. Mo Richards  has a past medical history of Allergic rhinitis, Cervicalgia (5/14/2013), Chronic LBP (9/25/2013), CKD (chronic kidney disease) stage 3, GFR 30-59 ml/min (HCC) (04/12/2016), Combined hyperlipidemia, COVID-19 (01/03/2021), COVID-19 vaccine series completed (04/01/2021), DDD (degenerative disc disease), cervical, Dysmetabolic syndrome X (7/77/7854), GERD (gastroesophageal reflux disease), GERD (gastroesophageal reflux disease), Gout (5/14/2013), HTN (hypertension), Low HDL (under 40), Migraine, NAFLD (nonalcoholic fatty liver disease), Nausea & vomiting, Obesity (5/14/2013), PRATIBHA (obstructive sleep apnea), and Prediabetes (2012). Mr. Mo Richards  has a past surgical history that includes hx cervical diskectomy (2004); hx lap cholecystectomy (2005); hx tonsillectomy (1951); hx cholecystectomy (2005); hx colonoscopy; hx endoscopy (2004); and hx lumbar laminectomy (1/2015). Social History/Living Environment:   Home Environment: Private residence  # Steps to Enter: 1  Rails to Enter: No  One/Two Story Residence: Two story, live on 1st floor  # of Interior Steps: 15  Interior Rails: Left  Lift Chair Available: No  Living Alone: No  Support Systems: Spouse/Significant Other/Partner  Patient Expects to be Discharged toF Cor[de-identified]ration  Current DME Used/Available at Home: Other (comment); Shower chair;Walker, rolling (high commode)  Tub or Shower Type: Shower    Work/Activity:  Employed, sitting  Dominant Side:  RIGHT  Current Medications:  See Pre-assessment nursing note   Number of Personal Factors/Comorbidities that affect the Plan of Care: 1-2: MODERATE COMPLEXITY   EXAMINATION:   ADLs (Current Functional Status):   Ambulation:  [x] Independent  [] Walk Indoors Only  [] Walk Outdoors  [] Use Assistive Device  [] Use Wheelchair Only Dressing:  [x] Independent  Requires Assistance from Someone for:  [] Sock/Shoes  [] Pants  [] Everything   Bathing/Showering:   [x] Independent  [] Requires Assistance from Someone  [] Sponge Bath Only Household Activities:  [] Routine house and yard work  [x] Light Housework Only  [] None   Observation/Orthostatic Postural Assessment:       ROM/Flexibility:   Gross Assessment: Yes  AROM: Within functional limits (left LE)                       RLE Assessment  RLE Assessment (WDL): Exceptions to WDL  RLE AROM  R Knee Flexion: 122  R Knee Extension: 2   Strength:   Gross Assessment: Yes  Strength: Generally decreased, functional (left LE)              RLE Strength  R Knee Flexion: 4  R Knee Extension: 4   Functional Mobility:    Gross Assessment: Yes    Gait Description (WDL): Exceptions to WDL  Stand to Sit: Additional time, Independent  Sit to Stand: Independent, Additional time  Distance (ft): 200 Feet (ft)  Ambulation - Level of Assistance: Independent; Additional time  Speed/Ania: Delayed  Stance: Right decreased  Gait Abnormalities: Antalgic          Balance:    Sitting: Intact  Standing: Intact   Body Structures Involved:  1. Bones  2. Joints  3. Muscles  4. Ligaments Body Functions Affected:  1. Movement Related Activities and Participation Affected:  1. Mobility   Number of elements that affect the Plan of Care: 3: MODERATE COMPLEXITY   CLINICAL PRESENTATION:   Presentation: Stable and uncomplicated: LOW COMPLEXITY   CLINICAL DECISION MAKING:   Outcome Measure: Tool Used: Lower Extremity Functional Scale (LEFS)  Score:  Initial: 35/80 Most Recent: X/80 (Date: -- )   Interpretation of Score: 20 questions each scored on a 5 point scale with 0 representing \"extreme difficulty or unable to perform\" and 4 representing \"no difficulty\". The lower the score, the greater the functional disability. 80/80 represents no disability. Minimal detectable change is 9 points.     Medical Necessity:   · Mr. Ana Baptiste is expected to optimize his lower extremity strength and ROM in preparation for joint replacement surgery. Reason for Services/Other Comments:  · Achieve baseline assesment of musculoskeletal system, functional mobility and home environment. , educate in PT HEP in preparation for surgery, educate in hospital plan of care. Use of outcome tool(s) and clinical judgement create a POC that gives a: Clear prediction of patient's progress: LOW COMPLEXITY   TREATMENT:   Treatment/Session Assessment:  Patient was instructed in PT- HEP to increase strength and ROM in LEs. Answered all questions. · Post session pain:  Knee pain  · Compliance with Program/Exercises: compliant most of the time.   Total Treatment Duration:  PT Patient Time In/Time Out  Time In: 0745  Time Out: 0800    Mele Lara PT

## 2021-07-27 VITALS
BODY MASS INDEX: 41.49 KG/M2 | HEART RATE: 62 BPM | RESPIRATION RATE: 16 BRPM | HEIGHT: 68 IN | TEMPERATURE: 98 F | SYSTOLIC BLOOD PRESSURE: 140 MMHG | WEIGHT: 273.8 LBS | OXYGEN SATURATION: 94 % | DIASTOLIC BLOOD PRESSURE: 75 MMHG

## 2021-07-27 NOTE — PROGRESS NOTES
21 0730   Oxygen Therapy   O2 Sat (%) 95 %   Pulse via Oximetry 84 beats per minute   O2 Device None (Room air)   Pre-Treatment   Breath Sounds Bilateral Clear   Pre FEV1 (liters) 2.6 liters   % Predicted 92     Initial respiratory Assessment completed with pt. Pt was interviewed and evaluated in Joint camp prior to surgery. Patient ID:  Kristen Ervin II  [id number]  53 y.o.  1945  Surgeon: Dr. Demetra Vaca  Date of Surgery: 2021  Procedure:  Total Right Knee Arthroplasty  Primary Care Physician: Raquel Gonzalez [phone]  Specialists:     Pt taught proper COUGH technique  DIAPHRAGMATIC BREATHING EXERCISE INSTRUCTIONS GIVEN    History of smokin/2 PPD FOR 4 YEARS                 Quit date:       48 YEARS AGO  Secondhand smoke:PARENTS    Past procedures with Oxygen desaturation or delayed awakening:DENIES    Past Medical History:   Diagnosis Date    Allergic rhinitis     Cervicalgia 2013    Chronic LBP 2013    CKD (chronic kidney disease) stage 3, GFR 30-59 ml/min ([facility] [address] 2016    managed by pcp    Combined hyperlipidemia     COVID-19 2021    pneumonia~hospital admission to floor x 3 days    COVID-19 vaccine series completed 2021    Moderna    DDD (degenerative disc disease), cervical     Dysmetabolic syndrome X     GERD (gastroesophageal reflux disease)     GERD (gastroesophageal reflux disease)     Gout 2013    HTN (hypertension)     Low HDL (under 40)     Migraine     hx of- last one     NAFLD (nonalcoholic fatty liver disease)     Nausea & vomiting     post op nausea and vomiting    Obesity 2013    PRATIBHA (obstructive sleep apnea)     uses CPAP    Prediabetes     takes Metformin; does not check sqbs daily; Hgb A1c 1/3/21:  7.2      HX OF COVID 19 2020 WITH PNA & HYPOXIA  WITH RESPIRATORY FAILURE & CONFUSION  Respiratory history:DENIES SOB Respiratory meds:  DENIES    FAMILY PRESENT:             NO     PAST SLEEP STUDY:        YES                  6/20/2014  HX OF PRATIBHA:                        YES                     PRATIBHA assessment:     AHI 58.4                                          SLEEPS ON SIDE       &      BACK          PHYSICAL EXAM   Body mass index is 41.63 kg/m². Visit Vitals  BP (!) 140/75 (BP 1 Location: Left upper arm, BP Patient Position: Sitting)   Pulse 62   Temp 98 °F (36.7 °C)   Resp 16   Ht 5' 8\" (1.727 m)   Wt 124.2 kg (273 lb 12.8 oz)   SpO2 94%   BMI 41.63 kg/m²     Neck circumference:  46    cm    Loud snoring:                                                 YES             Witnessed apnea or wakening gasping or choking:             APNEA  Awakens with headaches:                                             HX OF MIGRAINES  Morning or daytime tiredness/ sleepiness:                            TIRED  Dry mouth or sore throat in morning:            YES                                               Mejia stage:  4                                   SACS score:58  Stop Bang   STOP-BANG  Does the patient snore loudly (louder than talking or loud enough to be heard through closed doors)?: Yes  Does the patient often feel tired, fatigued, or sleepy during the daytime, even after a \"good\" night's sleep?: Yes  Has anyone ever observed the patient stop breathing during their sleep? : Yes  Does the patient have or are they being treated for high blood pressure?: Yes  Is the patient's BMI greater than 35?: Yes  Is your neck circumference greater than 17 inches (Male) or 16 inches (Female)?: Yes  Is the patient older than 48?: Yes  Is the patient male?: Yes  PRATIBHA Score: 8  Has the patient been referred to Sleep Medicine?: No  Has the patient previously been diagnosed with Obstructive Sleep Apnea?: Yes                            Pt. Is positive for PRATIBHA and uses HOME CPAP and will bring to Hospital day of surgery.   PT WILL NEED ASSISTANCE PLACING CPAP ON HS DURING HOSPITALIZATION.                                         Referrals:    Pt. Phone Number:

## 2021-07-28 ENCOUNTER — APPOINTMENT (OUTPATIENT)
Dept: PHYSICAL THERAPY | Age: 76
End: 2021-07-28
Payer: MEDICARE

## 2021-07-28 NOTE — H&P (VIEW-ONLY)
[id number] [facility]  Pre Operative History and Physical Exam    Patient ID:  Luna Curtis II  [id number]  71 y.o.  1945    Today: July 28, 2021           CC:  Right knee pain    HPI:   The patient has end stage arthritis of the right knee. The patient was evaluated and examined during a consultation prior to this office visit. There have been no changes to the patient's orthopedic condition since the initial consultation. The patient has failed previous conservative treatment for this condition including antiinflammatories , and lifestyle modifications. The necessity for joint replacement is present.  The patient will be admitted the day of surgery for right knee replacement    Past Medical/Surgical History:  Past Medical History:   Diagnosis Date    Allergic rhinitis     Cervicalgia 5/14/2013    Chronic LBP 9/25/2013    CKD (chronic kidney disease) stage 3, GFR 30-59 ml/min ([facility]) 04/12/2016    managed by pcp    Combined hyperlipidemia     COVID-19 01/03/2021    pneumonia~hospital admission to floor x 3 days    COVID-19 vaccine series completed 04/01/2021    Moderna    DDD (degenerative disc disease), cervical     Dysmetabolic syndrome X 6/66/6602    GERD (gastroesophageal reflux disease)     GERD (gastroesophageal reflux disease)     Gout 5/14/2013    HTN (hypertension)     Low HDL (under 40)     Migraine     hx of- last one June '14    NAFLD (nonalcoholic fatty liver disease)     Nausea & vomiting     post op nausea and vomiting    Obesity 5/14/2013    PRATIBHA (obstructive sleep apnea)     uses CPAP    Prediabetes 2012    takes Metformin; does not check sqbs daily; Hgb A1c 1/3/21:  7.2     Past Surgical History:   Procedure Laterality Date    HX CERVICAL DISKECTOMY  2004    with titanium sleeve    HX CHOLECYSTECTOMY  2005    HX COLONOSCOPY      HX ENDOSCOPY  2004    HX LAP CHOLECYSTECTOMY  2005    HX LUMBAR LAMINECTOMY  1/2015    [facility] Allergies: Allergies   Allergen Reactions    Codeine Other (comments)     Flushed and weird feeling    Lisinopril Cough    Ultram [Tramadol] Itching, Nausea Only and Other (comments)        Physical Exam:   General: NAD, Alert, Oriented, Appears their stated age     [de-identified]: NC/AT    Skin: No rashes, lesions or wounds seen      Psych: normal affect      Heart: Regular Rate, Rhythm     Lungs: unlabored respirations, no wheezing    Abdomen: Soft and non-distended     Ortho: Pain with limited ROM of the right knee    Neuro: no focal defects, moving extremities equally    Lymph: no lymphadenopathy     Meds:   Current Outpatient Medications   Medication Sig    metFORMIN (GLUCOPHAGE) 500 mg tablet Take 500 mg by mouth two (2) times daily (with meals). Indications: type 2 diabetes mellitus    fluticasone propionate (CUTIVATE) 0.05 % topical cream Apply  to affected area two (2) times a day. To face for dry skin patches on temples    famotidine (PEPCID) 20 mg tablet Take 20 mg by mouth two (2) times a day. Take / use AM day of surgery  per anesthesia protocols. Indications: gastroesophageal reflux disease    pantoprazole (PROTONIX) 20 mg tablet Take 20 mg by mouth as needed. Indications: gastroesophageal reflux disease    HYDROcodone-acetaminophen (NORCO) 5-325 mg per tablet 1 tab po bid prn pain    HYDROcodone-acetaminophen (NORCO) 5-325 mg per tablet 1 tab po bid prn pain    torsemide (DEMADEX) 10 mg tablet 1 tab po qam prn edema (Patient taking differently: Take 10 mg by mouth daily.)    allopurinol (ZYLOPRIM) 300 mg tablet 1 tab po qd (Patient taking differently: Take 300 mg by mouth daily. 1 tab po every day; Take / use AM day of surgery  per anesthesia protocols. Indications: treatment to prevent acute gout attack)    gabapentin (NEURONTIN) 300 mg capsule Take 1 Cap by mouth three (3) times daily. (Patient taking differently: Take 300 mg by mouth nightly.  2 capsules at bedtime)    losartan (COZAAR) 100 mg tablet TAKE 1 TABLET BY MOUTH EVERY DAY (Patient taking differently: Take 50 mg by mouth daily. TAKE 1 TABLET BY MOUTH EVERY DAY  Indications: high blood pressure)    potassium chloride (KLOR-CON) 10 mEq tablet Take 2 Tabs by mouth daily. (Patient taking differently: Take 10 mEq by mouth daily. Take 2 tablets daily; Take / use AM day of surgery  per anesthesia protocols. Indications: prevention of low potassium in the blood)    HYDROcodone-acetaminophen (NORCO) 5-325 mg per tablet Take 1 Tab by mouth two (2) times daily as needed for Pain. Max Daily Amount: 2 Tabs. (Patient taking differently: Take 1 Tablet by mouth two (2) times daily as needed for Pain. Indications: pain)    atorvastatin (LIPITOR) 10 mg tablet Take 1 Tab by mouth nightly. (Patient taking differently: Take 10 mg by mouth daily. Take / use AM day of surgery  per anesthesia protocols. Indications: excessive fat in the blood, high cholesterol and high triglycerides)    ibuprofen (MOTRIN) 200 mg tablet Take 800 mg by mouth every eight (8) hours as needed for Pain.  Omega-3 Fatty Acids 60- mg cpDR Take  by mouth.  coenzyme q10 10 mg cap Take  by mouth.  cpap machine kit by Does Not Apply route. 13 cm with water    OTHER (Compounded Cream) Gabapentin 4%, Topiramate 2.5%, Lidocaine 2%, Prilocaine 2%,    Apply 1-2 pumps of pain cream to faocal pain area 2 times daily    cetirizine (ZYRTEC) 10 mg tablet Take 10 mg by mouth two (2) times a day. Take / use AM day of surgery  per anesthesia protocols. Indications: inflammation of the nose due to an allergy    multivitamin (ONE A DAY) tablet Take 1 tablet by mouth daily. Last dose 12/29/14  Indications: VITAMIN DEFICIENCY PREVENTION     No current facility-administered medications for this visit.          Labs:  Hospital Outpatient Visit on 07/26/2021   Component Date Value Ref Range Status    WBC 07/26/2021 9.1  4.3 - 11.1 K/uL Final    RBC 07/26/2021 4.72  4.23 - 5.6 M/uL Final    HGB 07/26/2021 14.4  13.6 - 17.2 g/dL Final    HCT 07/26/2021 44.7  41.1 - 50.3 % Final    MCV 07/26/2021 94.7  79.6 - 97.8 FL Final    MCH 07/26/2021 30.5  26.1 - 32.9 PG Final    MCHC 07/26/2021 32.2  31.4 - 35.0 g/dL Final    RDW 07/26/2021 14.1  11.9 - 14.6 % Final    PLATELET 36/20/8922 990  150 - 450 K/uL Final    MPV 07/26/2021 11.4  9.4 - 12.3 FL Final    ABSOLUTE NRBC 07/26/2021 0.00  0.0 - 0.2 K/uL Final    **Note: Absolute NRBC parameter is now reported with Hemogram**    DF 07/26/2021 AUTOMATED    Final    NEUTROPHILS 07/26/2021 61  43 - 78 % Final    LYMPHOCYTES 07/26/2021 27  13 - 44 % Final    MONOCYTES 07/26/2021 8  4.0 - 12.0 % Final    EOSINOPHILS 07/26/2021 3  0.5 - 7.8 % Final    BASOPHILS 07/26/2021 0  0.0 - 2.0 % Final    IMMATURE GRANULOCYTES 07/26/2021 0  0.0 - 5.0 % Final    ABS. NEUTROPHILS 07/26/2021 5.6  1.7 - 8.2 K/UL Final    ABS. LYMPHOCYTES 07/26/2021 2.4  0.5 - 4.6 K/UL Final    ABS. MONOCYTES 07/26/2021 0.8  0.1 - 1.3 K/UL Final    ABS. EOSINOPHILS 07/26/2021 0.3  0.0 - 0.8 K/UL Final    ABS. BASOPHILS 07/26/2021 0.0  0.0 - 0.2 K/UL Final    ABS. IMM.  GRANS. 07/26/2021 0.0  0.0 - 0.5 K/UL Final    Prothrombin time 07/26/2021 13.4  12.5 - 14.7 sec Final    INR 07/26/2021 1.0    Final    Comment: Suggested therapeutic INR range:  Venous thrombosis and embolus  2.0-3.0  Prosthetic heart valve         2.5-3.5  ** Note new reference range and method **      aPTT 07/26/2021 33.7  24.1 - 35.1 SEC Final    Comment: Heparin Therapeutic Range = 74 - 123 seconds  In addition to factor deficiency, monitoring heparin therapy, etc., evaluation of a prolonged aPTT result should include consideration of preanalytic variables such as heparin flush contamination, specimen integrity issues, etc.      Sodium 07/26/2021 138  136 - 145 mmol/L Final    Potassium 07/26/2021 3.5  3.5 - 5.1 mmol/L Final    Chloride 07/26/2021 104  98 - 107 mmol/L Final    CO2 07/26/2021 28  21 - 32 mmol/L Final    Anion gap 07/26/2021 6* 7 - 16 mmol/L Final    Glucose 07/26/2021 127* 65 - 100 mg/dL Final    Comment: 47 - 60 mg/dl Consistent with, but not fully diagnostic of hypoglycemia. 101 - 125 mg/dl Impaired fasting glucose/consistent with pre-diabetes mellitus  > 126 mg/dl Fasting glucose consistent with overt diabetes mellitus      BUN 07/26/2021 23  8 - 23 MG/DL Final    Creatinine 07/26/2021 1.56* 0.8 - 1.5 MG/DL Final    GFR est AA 07/26/2021 56* >60 ml/min/1.73m2 Final    GFR est non-AA 07/26/2021 46* >60 ml/min/1.73m2 Final    Comment: (NOTE)  Estimated GFR is calculated using the Modification of Diet in Renal   Disease (MDRD) Study equation, reported for both  Americans   (GFRAA) and non- Americans (GFRNA), and normalized to 1.73m2   body surface area. The physician must decide which value applies to   the patient. The MDRD study equation should only be used in   individuals age 25 or older. It has not been validated for the   following: pregnant women, patients with serious comorbid conditions,   or on certain medications, or persons with extremes of body size,   muscle mass, or nutritional status.  Calcium 07/26/2021 9.9  8.3 - 10.4 MG/DL Final    Hemoglobin A1c 07/26/2021 5.9  4.2 - 6.3 % Final    Est. average glucose 07/26/2021 123  mg/dL Final    Comment: (NOTE)  The eAG should be interpreted with patient characteristics in mind   since ethnicity, interindividual differences, red cell lifespan,   variation in rates of glycation, etc. may affect the validity of the   calculation.  Special Requests: 07/26/2021 NASAL    Final    Culture result: 07/26/2021 SA target not detected. A MRSA NEGATIVE, SA NEGATIVE test result does not preclude MRSA or SA nasal colonization.     Final                 Patient Active Problem List   Diagnosis Code    Migraine without status migrainosus, not intractable K35.047    Dysmetabolic syndrome X E88.81    Morbid obesity ([facility]) E66.01    Hypogonadism, testicular E29.1    Low HDL (under 40) E78.6    Combined hyperlipidemia E78.2    Routine health maintenance Z00.00    Essential hypertension I10    Periodic limb movement disorder G47.61    Chronic venous insufficiency I87.2    PRATIBHA (obstructive sleep apnea) G47.33    NAFLD (nonalcoholic fatty liver disease) K76.0    Obesity, morbid, BMI 40.0-49.9 ([facility]) E66.01    Chronic neck pain M54.2, G89.29    Idiopathic gout M10.00    CKD (chronic kidney disease) stage 3, GFR 30-59 ml/min ([facility]) N18.30    Chronic bilateral low back pain without sciatica M54.5, G89.29    Impaired fasting glucose R73.01    DDD (degenerative disc disease), lumbar M51.36    Chronic pain syndrome G89.4    Dyspepsia R10.13    Sciatica associated with disorder of lumbar spine M53.86         Assessment:   1. Arthritis of the right knee      Plan:    1. Proceed with scheduled right knee replacement      The patient was counseled at length about the risks of antione Covid-19 during their perioperative period and any recovery window from their procedure. The patient was made aware that antione Covid-19  may worsen their prognosis for recovering from their procedure and lend to a higher morbidity and/or mortality risk. All material risks, benefits, and reasonable alternatives including postponing the procedure were discussed. The patient does  wish to proceed with the procedure at this time.          Signed By: CAROL Ryan  July 28, 2021

## 2021-08-02 ENCOUNTER — HOSPITAL ENCOUNTER (OUTPATIENT)
Dept: PHYSICAL THERAPY | Age: 76
Discharge: HOME | End: 2021-08-02
Payer: MEDICARE

## 2021-08-02 PROCEDURE — 97110 THERAPEUTIC EXERCISES: CPT

## 2021-08-02 NOTE — PROGRESS NOTES
Margit Canavan II  : 1945  Primary: Sharifa Sprain Medicare Advantage  Secondary:  [address] at [facility]  [address], [address], [place]  [phone]   [phone]                                  OUTPATIENT PHYSICAL THERAPY: Daily Treatment Note 2021  Visit Count:  7  ICD-10: Treatment Diagnosis: unilateral primary OA right knee (M17.11)  Pain in joint, right knee (M25.561)  Precautions/Allergies:   Codeine, Lisinopril, and Ultram [tramadol]   TREATMENT PLAN:  Effective Dates: 2021 TO 2021 (30 days). Frequency/Duration: 2 times a week for 30 Day(s)     MEDICAL/REFERRING DIAGNOSIS:  Primary osteoarthritis of right knee [M17.11]   DATE OF ONSET: chronic  REFERRING PHYSICIAN: Caio King MD MD Orders: Brandi Isabel and Treat  Return MD Appointment: 21     Pre-treatment Symptoms/Complaints:  Margit Canavan II reports TKA surgery scheduled for next week. Pt states sciatic pain causing more issues than knee right now. Pain: Initial: Pain Intensity 1: 2  Pain Location 1: Knee  Pain Orientation 1: Right  Post Session:  1/10   Medications Last Reviewed:  2021  Updated Objective Findings:  None today   TREATMENT:   THERAPEUTIC EXERCISE: (40 minutes):  Exercises per grid below to improve mobility, strength and coordination. Required minimal verbal cues to promote proper body mechanics. Progressed resistance, range and repetitions as indicated. Access Code: [email or web address]  URL: [email or web address]  Date: 2021  Prepared by:  Jaime Alatorre    Exercises  Supine Quad Set - 2 x daily - 10 reps - 5 hold  Supine Hip Adduction Isometric with Ball - 2 x daily - 10 reps - 5 hold  Supine Active Straight Leg Raise - 2 x daily - 10 reps  Supine Hamstring Stretch - 2 x daily - 1 sets - 10 reps  Sidelying Hip Abduction - 2 x daily - 10 reps - 5 hold Date:  7-1-21 Date:  7-7-21 Date:  7/12/21 Date:  7/14/21 Date:  7/19/21 Date:  7/26/21 Date:  8-2-21/21   Activity/Exercise Parameters Parameters Parameters       HEP As above  Just supine quad set Discussed HEP      SAQ  10x, 3 sets, 5# 10x, 3 sets, 5#  5# x 10 5# x 10 5# x 10   Heel slides  10x, 3 sets, manual resistance in push and pull 10x, 3 sets, manual resistance in push and pull       Shuttle  10x: 1 set 100 lbs, 2 xkv302# Bilateral 3 X 10, 125#; Unilateral RLE 2 X 10, 75#        Heel Raises  10x, 3 sets, low box  10x  1 set, flat; 2 sets on wedge Toe/heel x 10 // bars 5# x 10  // bars 5# x 10  // bars   Nustep  7' L1 8' L2 6' L2 Level 2 x 10' Level 2 x 10' Level 2 x 10'   Incline calf stretch      10 x 5\" 10 x 5\"   Hamstring Curls   Standing, RLE 3 X 10, 5#  5# x 10  // bars 5# x 10  // bars 5# x 10  // bars   LAQ   RLE 3 X 10, 5#   5# x 10 5# x 10   Sit to Stand   3 X 10       Step ups   Front and side; 3 X 10 each - mid sized step 10x, 2 sets, 6.5\"  Ant x 10 BLE  Lat x 10 BLE  Ant heel tap   X 10 BLE Ant x 10 BLE  Lat x 10 BLE  Ant heel tap   X 10 BLE Ant x 10 BLE  Lat x 10 BLE  Ant heel tap   X 10 BLE   Hip Abduction   Standing, RLE 3 X 10  5# x 10  // bars 5# x 10  // bars 5# x 10  // bars   SLR    10x, 1 sets, 2 sets 5# 5# x 10 5# x 10 5# x 10   Knee curls    10x, 2 sets      Hip bridge    10x, 3 sets, manual abduction resistance      Ball on wall squats       2 x 10   Supine piriformis       2 x 10 BLE     Modalities: 0 min Ice pack on R knee-  Not Billed    Treatment/Session Summary:    · Response to Treatment: Sheila Velarde II is scheduled for surgery next week. · Communication/Consultation:  None today  · Equipment provided today:  None today  · Recommendations/Intent for next treatment session: We will discharge at this time until orders received post op.     Total Treatment Billable Duration:  40 min therex  PT Patient Time In/Time Out  Time In: 1030  Time Out: 1115  Raynold Dakins, PT    Future Appointments   Date Time Provider Jordan Dulce   9/9/2021 10:30 AM MD RICHARD Altamirano POAI POA   5/18/2022  9:40 AM Tanesha Arechiga NP SSA PSCD PP

## 2021-08-03 NOTE — THERAPY DISCHARGE
Tri Tatum II  : 1945  Primary: Boni Wayne Medicare Advantage  Secondary:  [address]  at [facility]  [address], [place] [address]  [phone]   [phone]        OUTPATIENT PHYSICAL [facility] 2021    ICD-10: Treatment Diagnosis: unilateral primary OA right knee (M17.11)  Pain in joint, right knee (M25.561)  Precautions/Allergies:   Codeine, Lisinopril, and Ultram [tramadol]   TREATMENT PLAN:  Effective Dates: 2021 TO 2021 (30 days). Frequency/Duration: 2 times a week for 30 Day(s)     MEDICAL/REFERRING DIAGNOSIS:  Unilateral primary osteoarthritis, right knee [M17.11]   DATE OF ONSET: chronic  REFERRING PHYSICIAN: Mercedes Matson MD MD Orders: Dionna Velasquez and Treat  Return MD Appointment: 21   ATTENDANCE: As of 2021, Shaniqua Silva II has attended 7 out of 8 scheduled visits, with 0 cancellation(s) and 1 no shows. INITIAL ASSESSMENT:  Mr. Steven Tatum presents with complaints of chronic pain in his right knee. He states that walking and stair climbing are becoming progressively difficult. Pain improves with rest. Patient is scheduled for probable right TKA 21. Patient does note that he was diagnosed with COVID 19 in February, and was hospitalized 3 days. He noted significant decrease in leg strength after discharge, and reports \"melting\" to the floor x 4 over 2 days due to leg weakness. Pt may benefit from PT to address the following problem list.    PROGRESS REPORT: Shaniqua Silva II actually reporting more left sciatic pain than knee pain at present. Still has knee pain with step ups and long walks. He is scheduled for TKA next week. We will D/C at this time. PROBLEM LIST (Impacting functional limitations):  1. Decreased Strength  2. Decreased ADL/Functional Activities  3. Increased Pain  4. Decreased Flexibility/Joint Mobility INTERVENTIONS PLANNED:  1. Cryotherapy  2.  Electrical Stimulation  3. Home Exercise Program (HEP)  4. Manual Therapy  5. Therapeutic Exercise/Strengthening     GOALS: (Goals have been discussed and agreed upon with patient.)  Short-Term Functional Goals: Time Frame: 2 weeks  1. Independent in initial HEP Goal Met 8-2-21  2. Decrease pain with walking to < 5/10 Goal Met 8-2-21  Discharge Goals: Time Frame: 4 weeks  1. Independent in advanced HEP Goal Met 8-2-21  2. Decrease pain at rest to 1/10 Goal Met 8-2-21  3. Maintain full extension right knee Goal Met 8-2-21    NOTE: Patient scheduled for TKA 8-9-21. Please monitor Medicare usage. CLINICAL DECISION MAKING:   Outcome Measure: Tool Used: Lower Extremity Functional Scale (LEFS)  Score:  Initial: 34/80   7-1-21 Most Recent:33/80 (Date: 8-2-21 )   Interpretation of Score: 20 questions each scored on a 5 point scale with 0 representing \"extreme difficulty or unable to perform\" and 4 representing \"no difficulty\". The lower the score, the greater the functional disability. 80/80 represents no disability. Minimal detectable change is 9 points. PT Patient Time In/Time Out  Time In: 1030  Time Out: 1115  Rehabilitation Potential For Stated Goals: Good  Regarding Viola Kaiser II's therapy, I certify that the treatment plan above will be carried out by a therapist or under their direction.   Thank you for this referral,  Massiel Wiseman, PT

## 2021-08-04 ENCOUNTER — APPOINTMENT (OUTPATIENT)
Dept: PHYSICAL THERAPY | Age: 76
End: 2021-08-04
Payer: MEDICARE

## 2021-08-08 ENCOUNTER — ANESTHESIA EVENT (OUTPATIENT)
Dept: SURGERY | Age: 76
DRG: 470 | End: 2021-08-08
Payer: MEDICARE

## 2021-08-09 ENCOUNTER — HOSPITAL ENCOUNTER (INPATIENT)
Age: 76
LOS: 1 days | Discharge: HOME HEALTH | DRG: 470 | End: 2021-08-10
Attending: ORTHOPAEDIC SURGERY | Admitting: ORTHOPAEDIC SURGERY
Payer: MEDICARE

## 2021-08-09 ENCOUNTER — ANESTHESIA (OUTPATIENT)
Dept: SURGERY | Age: 76
DRG: 470 | End: 2021-08-09
Payer: MEDICARE

## 2021-08-09 ENCOUNTER — HOME HEALTH ADMISSION (OUTPATIENT)
Dept: HOME HEALTH SERVICES | Facility: HOME HEALTH | Age: 76
End: 2021-08-09
Payer: MEDICARE

## 2021-08-09 DIAGNOSIS — Z96.651 STATUS POST RIGHT KNEE REPLACEMENT: Primary | ICD-10-CM

## 2021-08-09 PROBLEM — M17.11 OSTEOARTHRITIS OF RIGHT KNEE: Status: ACTIVE | Noted: 2021-08-09

## 2021-08-09 LAB
COVID-19 RAPID TEST, COVR: NOT DETECTED
GLUCOSE BLD STRIP.AUTO-MCNC: 103 MG/DL (ref 65–100)
GLUCOSE BLD STRIP.AUTO-MCNC: 157 MG/DL (ref 65–100)
GLUCOSE BLD STRIP.AUTO-MCNC: 182 MG/DL (ref 65–100)
HGB BLD-MCNC: 13.3 G/DL (ref 13.6–17.2)
SARS-COV-2, COV2: NORMAL
SERVICE CMNT-IMP: ABNORMAL
SOURCE, COVRS: NORMAL

## 2021-08-09 PROCEDURE — 27447 TOTAL KNEE ARTHROPLASTY: CPT | Performed by: ORTHOPAEDIC SURGERY

## 2021-08-09 PROCEDURE — 77030012935 HC DRSG AQUACEL BMS -B: Performed by: ORTHOPAEDIC SURGERY

## 2021-08-09 PROCEDURE — 3E0T3BZ INTRODUCTION OF ANESTHETIC AGENT INTO PERIPHERAL NERVES AND PLEXI, PERCUTANEOUS APPROACH: ICD-10-PCS | Performed by: ANESTHESIOLOGY

## 2021-08-09 PROCEDURE — 87635 SARS-COV-2 COVID-19 AMP PRB: CPT

## 2021-08-09 PROCEDURE — 76010010054 HC POST OP PAIN BLOCK: Performed by: ORTHOPAEDIC SURGERY

## 2021-08-09 PROCEDURE — 74011250637 HC RX REV CODE- 250/637: Performed by: ANESTHESIOLOGY

## 2021-08-09 PROCEDURE — 77030002912 HC SUT ETHBND J&J -A: Performed by: ORTHOPAEDIC SURGERY

## 2021-08-09 PROCEDURE — 3E0T33Z INTRODUCTION OF ANTI-INFLAMMATORY INTO PERIPHERAL NERVES AND PLEXI, PERCUTANEOUS APPROACH: ICD-10-PCS | Performed by: ANESTHESIOLOGY

## 2021-08-09 PROCEDURE — 77030011208: Performed by: ORTHOPAEDIC SURGERY

## 2021-08-09 PROCEDURE — 74011250636 HC RX REV CODE- 250/636: Performed by: ANESTHESIOLOGY

## 2021-08-09 PROCEDURE — 2709999900 HC NON-CHARGEABLE SUPPLY

## 2021-08-09 PROCEDURE — 76210000006 HC OR PH I REC 0.5 TO 1 HR: Performed by: ORTHOPAEDIC SURGERY

## 2021-08-09 PROCEDURE — 77030035236 HC SUT PDS STRATFX BARB J&J -B: Performed by: ORTHOPAEDIC SURGERY

## 2021-08-09 PROCEDURE — 74011000250 HC RX REV CODE- 250: Performed by: ORTHOPAEDIC SURGERY

## 2021-08-09 PROCEDURE — 76010000171 HC OR TIME 2 TO 2.5 HR INTENSV-TIER 1: Performed by: ORTHOPAEDIC SURGERY

## 2021-08-09 PROCEDURE — 77030038149 HC BLD SAW SAG STRY -D: Performed by: ORTHOPAEDIC SURGERY

## 2021-08-09 PROCEDURE — 74011250636 HC RX REV CODE- 250/636: Performed by: PHYSICIAN ASSISTANT

## 2021-08-09 PROCEDURE — 74011250637 HC RX REV CODE- 250/637: Performed by: PHYSICIAN ASSISTANT

## 2021-08-09 PROCEDURE — 97535 SELF CARE MNGMENT TRAINING: CPT

## 2021-08-09 PROCEDURE — 74011636637 HC RX REV CODE- 636/637: Performed by: INTERNAL MEDICINE

## 2021-08-09 PROCEDURE — 97165 OT EVAL LOW COMPLEX 30 MIN: CPT

## 2021-08-09 PROCEDURE — 36415 COLL VENOUS BLD VENIPUNCTURE: CPT

## 2021-08-09 PROCEDURE — 74011000250 HC RX REV CODE- 250: Performed by: PHYSICIAN ASSISTANT

## 2021-08-09 PROCEDURE — 8E0Y0CZ ROBOTIC ASSISTED PROCEDURE OF LOWER EXTREMITY, OPEN APPROACH: ICD-10-PCS | Performed by: ORTHOPAEDIC SURGERY

## 2021-08-09 PROCEDURE — 82962 GLUCOSE BLOOD TEST: CPT

## 2021-08-09 PROCEDURE — 77030040922 HC BLNKT HYPOTHRM STRY -A: Performed by: ANESTHESIOLOGY

## 2021-08-09 PROCEDURE — 77030039760: Performed by: ORTHOPAEDIC SURGERY

## 2021-08-09 PROCEDURE — 77030029828 HC FEM TIB CKPNT KT DISP STRY -B: Performed by: ORTHOPAEDIC SURGERY

## 2021-08-09 PROCEDURE — 2709999900 HC NON-CHARGEABLE SUPPLY: Performed by: ORTHOPAEDIC SURGERY

## 2021-08-09 PROCEDURE — 77030040361 HC SLV COMPR DVT MDII -B

## 2021-08-09 PROCEDURE — 0SRC0J9 REPLACEMENT OF RIGHT KNEE JOINT WITH SYNTHETIC SUBSTITUTE, CEMENTED, OPEN APPROACH: ICD-10-PCS | Performed by: ORTHOPAEDIC SURGERY

## 2021-08-09 PROCEDURE — 94762 N-INVAS EAR/PLS OXIMTRY CONT: CPT

## 2021-08-09 PROCEDURE — 20985 CPTR-ASST DIR MS PX: CPT | Performed by: ORTHOPAEDIC SURGERY

## 2021-08-09 PROCEDURE — C1713 ANCHOR/SCREW BN/BN,TIS/BN: HCPCS | Performed by: ORTHOPAEDIC SURGERY

## 2021-08-09 PROCEDURE — 97161 PT EVAL LOW COMPLEX 20 MIN: CPT

## 2021-08-09 PROCEDURE — 76942 ECHO GUIDE FOR BIOPSY: CPT | Performed by: ORTHOPAEDIC SURGERY

## 2021-08-09 PROCEDURE — C1776 JOINT DEVICE (IMPLANTABLE): HCPCS | Performed by: ORTHOPAEDIC SURGERY

## 2021-08-09 PROCEDURE — 74011250636 HC RX REV CODE- 250/636: Performed by: ORTHOPAEDIC SURGERY

## 2021-08-09 PROCEDURE — 99218 HC RM OBSERVATION: CPT

## 2021-08-09 PROCEDURE — 74011000250 HC RX REV CODE- 250: Performed by: ANESTHESIOLOGY

## 2021-08-09 PROCEDURE — 77030029820: Performed by: ORTHOPAEDIC SURGERY

## 2021-08-09 PROCEDURE — 85018 HEMOGLOBIN: CPT

## 2021-08-09 PROCEDURE — 77030019557 HC ELECTRD VES SEAL MEDT -F: Performed by: ORTHOPAEDIC SURGERY

## 2021-08-09 PROCEDURE — 97110 THERAPEUTIC EXERCISES: CPT

## 2021-08-09 PROCEDURE — 74011250636 HC RX REV CODE- 250/636: Performed by: NURSE ANESTHETIST, CERTIFIED REGISTERED

## 2021-08-09 PROCEDURE — 74011000258 HC RX REV CODE- 258: Performed by: ORTHOPAEDIC SURGERY

## 2021-08-09 PROCEDURE — 77030007880 HC KT SPN EPDRL BBMI -B: Performed by: ANESTHESIOLOGY

## 2021-08-09 PROCEDURE — 94760 N-INVAS EAR/PLS OXIMETRY 1: CPT

## 2021-08-09 PROCEDURE — 76060000035 HC ANESTHESIA 2 TO 2.5 HR: Performed by: ORTHOPAEDIC SURGERY

## 2021-08-09 PROCEDURE — 77030003602 HC NDL NRV BLK BBMI -B: Performed by: ANESTHESIOLOGY

## 2021-08-09 PROCEDURE — 77030031139 HC SUT VCRL2 J&J -A: Performed by: ORTHOPAEDIC SURGERY

## 2021-08-09 PROCEDURE — 77030003665 HC NDL SPN BBMI -A: Performed by: ANESTHESIOLOGY

## 2021-08-09 PROCEDURE — 77030008462 HC STPLR SKN PROX J&J -A: Performed by: ORTHOPAEDIC SURGERY

## 2021-08-09 DEVICE — BASEPLATE TIB SZ 6 AP52MM ML77MM KNEE TRITANIUM 4 CRUCFRM: Type: IMPLANTABLE DEVICE | Site: KNEE | Status: FUNCTIONAL

## 2021-08-09 DEVICE — KNEE K3 TOT HYBRID -- IMPL CAPPED K3: Type: IMPLANTABLE DEVICE | Status: FUNCTIONAL

## 2021-08-09 DEVICE — INSERT TIB SZ 6 THK9MM UNIV KNEE POLYETH CNDYL STBL PRI NEUT: Type: IMPLANTABLE DEVICE | Site: KNEE | Status: FUNCTIONAL

## 2021-08-09 DEVICE — FEM KNE CEM CR SZ 5 RT -- TRIATHLON: Type: IMPLANTABLE DEVICE | Site: KNEE | Status: FUNCTIONAL

## 2021-08-09 DEVICE — CEMENT BONE 40GM HI VISC PALACOS R: Type: IMPLANTABLE DEVICE | Site: KNEE | Status: FUNCTIONAL

## 2021-08-09 RX ORDER — DEXAMETHASONE SODIUM PHOSPHATE 4 MG/ML
INJECTION, SOLUTION INTRA-ARTICULAR; INTRALESIONAL; INTRAMUSCULAR; INTRAVENOUS; SOFT TISSUE
Status: COMPLETED | OUTPATIENT
Start: 2021-08-09 | End: 2021-08-09

## 2021-08-09 RX ORDER — PROPOFOL 10 MG/ML
INJECTION, EMULSION INTRAVENOUS
Status: DISCONTINUED | OUTPATIENT
Start: 2021-08-09 | End: 2021-08-09 | Stop reason: HOSPADM

## 2021-08-09 RX ORDER — CEFAZOLIN SODIUM/WATER 2 G/20 ML
2 SYRINGE (ML) INTRAVENOUS EVERY 8 HOURS
Status: COMPLETED | OUTPATIENT
Start: 2021-08-09 | End: 2021-08-09

## 2021-08-09 RX ORDER — ROPIVACAINE HYDROCHLORIDE 2 MG/ML
INJECTION, SOLUTION EPIDURAL; INFILTRATION; PERINEURAL
Status: COMPLETED | OUTPATIENT
Start: 2021-08-09 | End: 2021-08-09

## 2021-08-09 RX ORDER — LABETALOL HYDROCHLORIDE 5 MG/ML
10 INJECTION, SOLUTION INTRAVENOUS
Status: DISCONTINUED | OUTPATIENT
Start: 2021-08-09 | End: 2021-08-10 | Stop reason: HOSPADM

## 2021-08-09 RX ORDER — ACETAMINOPHEN 500 MG
1000 TABLET ORAL
Status: DISCONTINUED | OUTPATIENT
Start: 2021-08-09 | End: 2021-08-10 | Stop reason: HOSPADM

## 2021-08-09 RX ORDER — DIPHENHYDRAMINE HYDROCHLORIDE 50 MG/ML
12.5 INJECTION, SOLUTION INTRAMUSCULAR; INTRAVENOUS
Status: DISCONTINUED | OUTPATIENT
Start: 2021-08-09 | End: 2021-08-09 | Stop reason: HOSPADM

## 2021-08-09 RX ORDER — MIDAZOLAM HYDROCHLORIDE 1 MG/ML
2 INJECTION, SOLUTION INTRAMUSCULAR; INTRAVENOUS ONCE
Status: COMPLETED | OUTPATIENT
Start: 2021-08-09 | End: 2021-08-09

## 2021-08-09 RX ORDER — ALBUTEROL SULFATE 0.83 MG/ML
2.5 SOLUTION RESPIRATORY (INHALATION) AS NEEDED
Status: DISCONTINUED | OUTPATIENT
Start: 2021-08-09 | End: 2021-08-09 | Stop reason: HOSPADM

## 2021-08-09 RX ORDER — OXYCODONE HYDROCHLORIDE 5 MG/1
10 TABLET ORAL
Status: DISCONTINUED | OUTPATIENT
Start: 2021-08-09 | End: 2021-08-09 | Stop reason: HOSPADM

## 2021-08-09 RX ORDER — ACETAMINOPHEN 650 MG/1
650 SUPPOSITORY RECTAL ONCE
Status: DISCONTINUED | OUTPATIENT
Start: 2021-08-09 | End: 2021-08-09 | Stop reason: SDUPTHER

## 2021-08-09 RX ORDER — SODIUM CHLORIDE 0.9 % (FLUSH) 0.9 %
5-40 SYRINGE (ML) INJECTION EVERY 8 HOURS
Status: DISCONTINUED | OUTPATIENT
Start: 2021-08-09 | End: 2021-08-10 | Stop reason: HOSPADM

## 2021-08-09 RX ORDER — ALLOPURINOL 300 MG/1
300 TABLET ORAL DAILY
Status: DISCONTINUED | OUTPATIENT
Start: 2021-08-10 | End: 2021-08-10 | Stop reason: HOSPADM

## 2021-08-09 RX ORDER — ACETAMINOPHEN 500 MG
1000 TABLET ORAL ONCE
Status: COMPLETED | OUTPATIENT
Start: 2021-08-09 | End: 2021-08-09

## 2021-08-09 RX ORDER — AMOXICILLIN 250 MG
2 CAPSULE ORAL DAILY
Status: DISCONTINUED | OUTPATIENT
Start: 2021-08-10 | End: 2021-08-10 | Stop reason: HOSPADM

## 2021-08-09 RX ORDER — POTASSIUM CHLORIDE 750 MG/1
20 TABLET, EXTENDED RELEASE ORAL DAILY
Status: DISCONTINUED | OUTPATIENT
Start: 2021-08-10 | End: 2021-08-10 | Stop reason: HOSPADM

## 2021-08-09 RX ORDER — TORSEMIDE 20 MG/1
10 TABLET ORAL DAILY
Status: DISCONTINUED | OUTPATIENT
Start: 2021-08-10 | End: 2021-08-10 | Stop reason: HOSPADM

## 2021-08-09 RX ORDER — PROPOFOL 10 MG/ML
INJECTION, EMULSION INTRAVENOUS AS NEEDED
Status: DISCONTINUED | OUTPATIENT
Start: 2021-08-09 | End: 2021-08-09 | Stop reason: HOSPADM

## 2021-08-09 RX ORDER — ACETAMINOPHEN 325 MG/1
975 TABLET ORAL ONCE
Status: DISCONTINUED | OUTPATIENT
Start: 2021-08-09 | End: 2021-08-09 | Stop reason: SDUPTHER

## 2021-08-09 RX ORDER — ROPIVACAINE HYDROCHLORIDE 2 MG/ML
INJECTION, SOLUTION EPIDURAL; INFILTRATION; PERINEURAL AS NEEDED
Status: DISCONTINUED | OUTPATIENT
Start: 2021-08-09 | End: 2021-08-09 | Stop reason: HOSPADM

## 2021-08-09 RX ORDER — MIDAZOLAM HYDROCHLORIDE 1 MG/ML
INJECTION, SOLUTION INTRAMUSCULAR; INTRAVENOUS AS NEEDED
Status: DISCONTINUED | OUTPATIENT
Start: 2021-08-09 | End: 2021-08-09 | Stop reason: HOSPADM

## 2021-08-09 RX ORDER — INSULIN LISPRO 100 [IU]/ML
INJECTION, SOLUTION INTRAVENOUS; SUBCUTANEOUS
Status: DISCONTINUED | OUTPATIENT
Start: 2021-08-09 | End: 2021-08-10 | Stop reason: HOSPADM

## 2021-08-09 RX ORDER — CEFAZOLIN SODIUM/WATER 2 G/20 ML
2 SYRINGE (ML) INTRAVENOUS ONCE
Status: DISCONTINUED | OUTPATIENT
Start: 2021-08-09 | End: 2021-08-09 | Stop reason: DRUGHIGH

## 2021-08-09 RX ORDER — OXYCODONE HYDROCHLORIDE 5 MG/1
5 TABLET ORAL
Status: DISCONTINUED | OUTPATIENT
Start: 2021-08-09 | End: 2021-08-09 | Stop reason: HOSPADM

## 2021-08-09 RX ORDER — HYDROMORPHONE HYDROCHLORIDE 1 MG/ML
1 INJECTION, SOLUTION INTRAMUSCULAR; INTRAVENOUS; SUBCUTANEOUS
Status: DISCONTINUED | OUTPATIENT
Start: 2021-08-09 | End: 2021-08-10 | Stop reason: HOSPADM

## 2021-08-09 RX ORDER — VANCOMYCIN HYDROCHLORIDE 1 G/20ML
INJECTION, POWDER, LYOPHILIZED, FOR SOLUTION INTRAVENOUS AS NEEDED
Status: DISCONTINUED | OUTPATIENT
Start: 2021-08-09 | End: 2021-08-09 | Stop reason: HOSPADM

## 2021-08-09 RX ORDER — HYDROCODONE BITARTRATE AND ACETAMINOPHEN 7.5; 325 MG/1; MG/1
1 TABLET ORAL
Status: DISCONTINUED | OUTPATIENT
Start: 2021-08-09 | End: 2021-08-10 | Stop reason: HOSPADM

## 2021-08-09 RX ORDER — CETIRIZINE HCL 10 MG
10 TABLET ORAL 2 TIMES DAILY
Status: DISCONTINUED | OUTPATIENT
Start: 2021-08-09 | End: 2021-08-10 | Stop reason: HOSPADM

## 2021-08-09 RX ORDER — ACETAMINOPHEN 500 MG
1000 TABLET ORAL
COMMUNITY

## 2021-08-09 RX ORDER — ATORVASTATIN CALCIUM 10 MG/1
10 TABLET, FILM COATED ORAL DAILY
Status: DISCONTINUED | OUTPATIENT
Start: 2021-08-10 | End: 2021-08-10 | Stop reason: HOSPADM

## 2021-08-09 RX ORDER — DEXAMETHASONE SODIUM PHOSPHATE 4 MG/ML
INJECTION, SOLUTION INTRA-ARTICULAR; INTRALESIONAL; INTRAMUSCULAR; INTRAVENOUS; SOFT TISSUE AS NEEDED
Status: DISCONTINUED | OUTPATIENT
Start: 2021-08-09 | End: 2021-08-09 | Stop reason: HOSPADM

## 2021-08-09 RX ORDER — NALOXONE HYDROCHLORIDE 0.4 MG/ML
0.1 INJECTION, SOLUTION INTRAMUSCULAR; INTRAVENOUS; SUBCUTANEOUS AS NEEDED
Status: DISCONTINUED | OUTPATIENT
Start: 2021-08-09 | End: 2021-08-09 | Stop reason: HOSPADM

## 2021-08-09 RX ORDER — SODIUM CHLORIDE, SODIUM LACTATE, POTASSIUM CHLORIDE, CALCIUM CHLORIDE 600; 310; 30; 20 MG/100ML; MG/100ML; MG/100ML; MG/100ML
100 INJECTION, SOLUTION INTRAVENOUS CONTINUOUS
Status: DISCONTINUED | OUTPATIENT
Start: 2021-08-09 | End: 2021-08-09 | Stop reason: HOSPADM

## 2021-08-09 RX ORDER — LOSARTAN POTASSIUM 50 MG/1
50 TABLET ORAL DAILY
Status: DISCONTINUED | OUTPATIENT
Start: 2021-08-10 | End: 2021-08-10 | Stop reason: HOSPADM

## 2021-08-09 RX ORDER — GABAPENTIN 300 MG/1
600 CAPSULE ORAL
Status: DISCONTINUED | OUTPATIENT
Start: 2021-08-09 | End: 2021-08-10 | Stop reason: HOSPADM

## 2021-08-09 RX ORDER — FAMOTIDINE 20 MG/1
20 TABLET, FILM COATED ORAL 2 TIMES DAILY
Status: DISCONTINUED | OUTPATIENT
Start: 2021-08-09 | End: 2021-08-10 | Stop reason: HOSPADM

## 2021-08-09 RX ORDER — ONDANSETRON 2 MG/ML
4 INJECTION INTRAMUSCULAR; INTRAVENOUS ONCE
Status: DISCONTINUED | OUTPATIENT
Start: 2021-08-09 | End: 2021-08-09 | Stop reason: HOSPADM

## 2021-08-09 RX ORDER — ONDANSETRON 2 MG/ML
INJECTION INTRAMUSCULAR; INTRAVENOUS AS NEEDED
Status: DISCONTINUED | OUTPATIENT
Start: 2021-08-09 | End: 2021-08-09 | Stop reason: HOSPADM

## 2021-08-09 RX ORDER — CELECOXIB 200 MG/1
200 CAPSULE ORAL EVERY 12 HOURS
Status: DISCONTINUED | OUTPATIENT
Start: 2021-08-09 | End: 2021-08-10 | Stop reason: HOSPADM

## 2021-08-09 RX ORDER — BUPIVACAINE HYDROCHLORIDE 7.5 MG/ML
INJECTION, SOLUTION INTRASPINAL
Status: COMPLETED | OUTPATIENT
Start: 2021-08-09 | End: 2021-08-09

## 2021-08-09 RX ORDER — HYDROMORPHONE HYDROCHLORIDE 2 MG/ML
0.5 INJECTION, SOLUTION INTRAMUSCULAR; INTRAVENOUS; SUBCUTANEOUS
Status: DISCONTINUED | OUTPATIENT
Start: 2021-08-09 | End: 2021-08-09 | Stop reason: HOSPADM

## 2021-08-09 RX ORDER — TRANEXAMIC ACID 100 MG/ML
INJECTION, SOLUTION INTRAVENOUS AS NEEDED
Status: DISCONTINUED | OUTPATIENT
Start: 2021-08-09 | End: 2021-08-09 | Stop reason: HOSPADM

## 2021-08-09 RX ORDER — ACETAMINOPHEN 500 MG
1000 TABLET ORAL EVERY 6 HOURS
Status: DISCONTINUED | OUTPATIENT
Start: 2021-08-09 | End: 2021-08-09

## 2021-08-09 RX ORDER — CELECOXIB 200 MG/1
200 CAPSULE ORAL ONCE
Status: COMPLETED | OUTPATIENT
Start: 2021-08-09 | End: 2021-08-09

## 2021-08-09 RX ORDER — ONDANSETRON 4 MG/1
4 TABLET, ORALLY DISINTEGRATING ORAL
Status: DISCONTINUED | OUTPATIENT
Start: 2021-08-09 | End: 2021-08-10 | Stop reason: HOSPADM

## 2021-08-09 RX ORDER — DIPHENHYDRAMINE HCL 25 MG
25 CAPSULE ORAL
Status: DISCONTINUED | OUTPATIENT
Start: 2021-08-09 | End: 2021-08-10 | Stop reason: HOSPADM

## 2021-08-09 RX ORDER — SODIUM CHLORIDE 0.9 % (FLUSH) 0.9 %
5-40 SYRINGE (ML) INJECTION AS NEEDED
Status: DISCONTINUED | OUTPATIENT
Start: 2021-08-09 | End: 2021-08-10 | Stop reason: HOSPADM

## 2021-08-09 RX ORDER — MIDAZOLAM HYDROCHLORIDE 1 MG/ML
2 INJECTION, SOLUTION INTRAMUSCULAR; INTRAVENOUS
Status: DISCONTINUED | OUTPATIENT
Start: 2021-08-09 | End: 2021-08-09 | Stop reason: HOSPADM

## 2021-08-09 RX ORDER — DEXAMETHASONE SODIUM PHOSPHATE 100 MG/10ML
10 INJECTION INTRAMUSCULAR; INTRAVENOUS ONCE
Status: ACTIVE | OUTPATIENT
Start: 2021-08-09 | End: 2021-08-10

## 2021-08-09 RX ORDER — PANTOPRAZOLE SODIUM 20 MG/1
20 TABLET, DELAYED RELEASE ORAL AS NEEDED
Status: DISCONTINUED | OUTPATIENT
Start: 2021-08-09 | End: 2021-08-09

## 2021-08-09 RX ORDER — SODIUM CHLORIDE 9 MG/ML
100 INJECTION, SOLUTION INTRAVENOUS CONTINUOUS
Status: DISCONTINUED | OUTPATIENT
Start: 2021-08-09 | End: 2021-08-10 | Stop reason: HOSPADM

## 2021-08-09 RX ORDER — LIDOCAINE HYDROCHLORIDE 10 MG/ML
0.1 INJECTION INFILTRATION; PERINEURAL AS NEEDED
Status: DISCONTINUED | OUTPATIENT
Start: 2021-08-09 | End: 2021-08-09 | Stop reason: HOSPADM

## 2021-08-09 RX ORDER — FENTANYL CITRATE 50 UG/ML
100 INJECTION, SOLUTION INTRAMUSCULAR; INTRAVENOUS ONCE
Status: DISCONTINUED | OUTPATIENT
Start: 2021-08-09 | End: 2021-08-09 | Stop reason: HOSPADM

## 2021-08-09 RX ORDER — NALOXONE HYDROCHLORIDE 0.4 MG/ML
.2-.4 INJECTION, SOLUTION INTRAMUSCULAR; INTRAVENOUS; SUBCUTANEOUS
Status: DISCONTINUED | OUTPATIENT
Start: 2021-08-09 | End: 2021-08-10 | Stop reason: HOSPADM

## 2021-08-09 RX ORDER — ASPIRIN 81 MG/1
81 TABLET ORAL EVERY 12 HOURS
Status: DISCONTINUED | OUTPATIENT
Start: 2021-08-09 | End: 2021-08-10 | Stop reason: HOSPADM

## 2021-08-09 RX ORDER — METFORMIN HYDROCHLORIDE 500 MG/1
500 TABLET ORAL 2 TIMES DAILY WITH MEALS
Status: DISCONTINUED | OUTPATIENT
Start: 2021-08-09 | End: 2021-08-10 | Stop reason: HOSPADM

## 2021-08-09 RX ADMIN — INSULIN LISPRO 1 UNITS: 100 INJECTION, SOLUTION INTRAVENOUS; SUBCUTANEOUS at 16:50

## 2021-08-09 RX ADMIN — Medication 10 ML: at 22:01

## 2021-08-09 RX ADMIN — DEXAMETHASONE SODIUM PHOSPHATE 5 MG: 4 INJECTION, SOLUTION INTRAMUSCULAR; INTRAVENOUS at 09:13

## 2021-08-09 RX ADMIN — GABAPENTIN 600 MG: 300 CAPSULE ORAL at 22:00

## 2021-08-09 RX ADMIN — MIDAZOLAM 2 MG: 1 INJECTION INTRAMUSCULAR; INTRAVENOUS at 09:12

## 2021-08-09 RX ADMIN — FAMOTIDINE 20 MG: 20 TABLET ORAL at 18:00

## 2021-08-09 RX ADMIN — CEFAZOLIN SODIUM 3 G: 100 INJECTION, POWDER, LYOPHILIZED, FOR SOLUTION INTRAVENOUS at 10:15

## 2021-08-09 RX ADMIN — Medication 81 MG: at 22:00

## 2021-08-09 RX ADMIN — PROPOFOL 100 MCG/KG/MIN: 10 INJECTION, EMULSION INTRAVENOUS at 10:26

## 2021-08-09 RX ADMIN — TRANEXAMIC ACID 1 G: 100 INJECTION, SOLUTION INTRAVENOUS at 10:05

## 2021-08-09 RX ADMIN — CEFAZOLIN SODIUM 2 G: 10 INJECTION, POWDER, FOR SOLUTION INTRAVENOUS at 22:14

## 2021-08-09 RX ADMIN — PROPOFOL 40 MG: 10 INJECTION, EMULSION INTRAVENOUS at 10:26

## 2021-08-09 RX ADMIN — SODIUM CHLORIDE, SODIUM LACTATE, POTASSIUM CHLORIDE, AND CALCIUM CHLORIDE 100 ML/HR: 600; 310; 30; 20 INJECTION, SOLUTION INTRAVENOUS at 08:31

## 2021-08-09 RX ADMIN — CEFAZOLIN SODIUM 2 G: 10 INJECTION, POWDER, FOR SOLUTION INTRAVENOUS at 17:29

## 2021-08-09 RX ADMIN — ROPIVACAINE HYDROCHLORIDE 40 MG: 2 INJECTION, SOLUTION EPIDURAL; INFILTRATION at 09:13

## 2021-08-09 RX ADMIN — BUPIVACAINE HYDROCHLORIDE IN DEXTROSE 12.5 MG: 7.5 INJECTION, SOLUTION SUBARACHNOID at 10:07

## 2021-08-09 RX ADMIN — MIDAZOLAM 2 MG: 1 INJECTION INTRAMUSCULAR; INTRAVENOUS at 10:00

## 2021-08-09 RX ADMIN — HYDROCODONE BITARTRATE AND ACETAMINOPHEN 1 TABLET: 7.5; 325 TABLET ORAL at 16:48

## 2021-08-09 RX ADMIN — ACETAMINOPHEN 1000 MG: 500 TABLET, FILM COATED ORAL at 08:32

## 2021-08-09 RX ADMIN — Medication 10 MG: at 21:00

## 2021-08-09 RX ADMIN — CELECOXIB 200 MG: 200 CAPSULE ORAL at 08:32

## 2021-08-09 RX ADMIN — DEXAMETHASONE SODIUM PHOSPHATE 10 MG: 4 INJECTION, SOLUTION INTRA-ARTICULAR; INTRALESIONAL; INTRAMUSCULAR; INTRAVENOUS; SOFT TISSUE at 11:15

## 2021-08-09 RX ADMIN — HYDROCODONE BITARTRATE AND ACETAMINOPHEN 1 TABLET: 7.5; 325 TABLET ORAL at 22:00

## 2021-08-09 RX ADMIN — ONDANSETRON 4 MG: 2 INJECTION INTRAMUSCULAR; INTRAVENOUS at 11:15

## 2021-08-09 RX ADMIN — CELECOXIB 200 MG: 200 CAPSULE ORAL at 22:00

## 2021-08-09 RX ADMIN — Medication 1 AMPULE: at 22:00

## 2021-08-09 RX ADMIN — Medication 3 AMPULE: at 08:32

## 2021-08-09 NOTE — INTERVAL H&P NOTE
Update History & Physical    The Patient's History and Physical of July 28,   H&P was reviewed with the patient and I examined the patient. There was no change. The surgical site was confirmed by the patient and me. Plan:  The risk, benefits, expected outcome, and alternative to the recommended procedure have been discussed with the patient. Patient understands and wants to proceed with the procedure.     Electronically signed by Anette Salas MD on 8/9/2021 at 8:56 AM

## 2021-08-09 NOTE — CONSULTS
[facility] Hospitalist Consult   Admit Date:  2021  7:29 AM   Name:  Lucy Patel II   Age:  78 y.o. Sex:  male  :  1945   MRN:  [id number]     Presenting Complaint: No chief complaint on file. Reason(s) for Admission: Primary osteoarthritis of right knee [M17.11]  Osteoarthritis of right knee [M17.11]     Reason for consult: Management of lower extremity swelling, diabetes, hypertension. History of Presenting Illness:   Lucy Patel II is a 78 y.o. male with history of diabetes/prediabetes, hypertension, obstructive sleep apnea and lower extremity swelling admitted to Ortho service and status post RIGHT KNEE ARTHROPLASTY TOTAL ROBOTIC ASSISTED PEDRO/[facility]  [address] [facility] Medicine service consulted for management of hypertension, diabetes and lower extremity swelling. # Diabetes mellitus: As per patient his diabetes is so well controlled now he has prediabetes. He takes Metformin at home. #Hypertension: As per patient he lost significant weight after Covid infection in January that his losartan use is now 50 mg instead of 100 mg. #Restless leg syndrome: Uses gabapentin at home. #Obstructive sleep apnea: We will CPAP at home. Denies any cigarette/alcohol or illicit drug use. Family history positive for hypertension in multiple family members. Past medical history, social history, family history, hospital course and home medication reviewed. As per patient currently he is under anesthesia and does not have any pain at present. Denies any nausea, vomiting, chest pain or palpitations. Review of Systems:  10 systems reviewed and negative except as noted in HPI. Assessment & Plan:     #Diabetes mellitus: We will hold Metformin and will use sliding scale high sensitivity. #Hypertension: Continue home medication. As needed labetalol ordered. #PRATIBHA: CPAP ordered. Family will bring CPAP from home.     #Restless leg syndrome: Continue home gabapentin. #Lower extremity edema: Continue home torsemide. Principal Problem:    Status post right knee replacement (8/9/2021)    Surgery and surgery related management including DVT prophylaxis, drop in hemoglobin and pain management per primary team.    Rest management per primary team.    Please notify medicine team if drop in hemoglobin is more than expected for the surgery. Thanks for consultation. We will continue to follow along on daily basis. Please notify on-call medicine physician with questions or if patient clinical condition changes. If patient gets discharged home then he needs to follow-up with primary care in 1 week of discharge. If patient goes home then he should be discharged on his home medication based on today's evaluation.             Past Medical History:   Diagnosis Date    Allergic rhinitis     Cervicalgia 5/14/2013    Chronic LBP 9/25/2013    CKD (chronic kidney disease) stage 3, GFR 30-59 ml/min ([facility]) 04/12/2016    managed by pcp    Combined hyperlipidemia     COVID-19 01/03/2021    pneumonia~hospital admission to floor x 3 days    COVID-19 vaccine series completed 04/01/2021    Moderna    DDD (degenerative disc disease), cervical     Dysmetabolic syndrome X 9/49/8619    GERD (gastroesophageal reflux disease)     GERD (gastroesophageal reflux disease)     Gout 5/14/2013    HTN (hypertension)     Low HDL (under 40)     Migraine     hx of- last one June '14    NAFLD (nonalcoholic fatty liver disease)     Nausea & vomiting     post op nausea and vomiting    Obesity 5/14/2013    PRATIBHA (obstructive sleep apnea)     uses CPAP    Prediabetes 2012    takes Metformin; does not check sqbs daily; Hgb A1c 1/3/21:  7.2      Past Surgical History:   Procedure Laterality Date    HX CERVICAL DISKECTOMY  2004    with titanium sleeve    HX CHOLECYSTECTOMY  2005    HX COLONOSCOPY      HX ENDOSCOPY  2004    HX LAP CHOLECYSTECTOMY  2005    HX LUMBAR LAMINECTOMY  1/2015  HX TONSILLECTOMY  195      Allergies   Allergen Reactions    Codeine Other (comments)     Flushed and weird feeling    Lisinopril Cough    Ultram [Tramadol] Itching, Nausea Only and Other (comments)      Social History     Tobacco Use    Smoking status: Former Smoker     Packs/day: 0.25     Years: 20.00     Pack years: 5.00     Quit date: 1975     Years since quittin.0    Smokeless tobacco: Never Used    Tobacco comment: quit 40 yeras ago   Substance Use Topics    Alcohol use: Yes     Alcohol/week: 1.0 standard drinks     Types: 1 Cans of beer per week     Comment: rarely      Family History   Problem Relation Age of Onset    Hypertension Brother     Cancer Brother         Lymphoma    Colon Cancer Mother 80    Cancer Mother         Colon    Headache Mother     Heart Disease Mother     Migraines Mother     Coronary Artery Disease Father 68    Diabetes Father     Heart Disease Father     Hypertension Father     Stroke Father     Parkinsonism Brother     Hypertension Brother     Cancer Brother         lymphoma      Family history reviewed and noncontributory to patient's acute condition; no relevant family history unless otherwise noted above.   Immunization History   Administered Date(s) Administered    Influenza Vaccine 2015, 2018    Pneumococcal Conjugate (PCV-13) 2015    Pneumococcal Polysaccharide (PPSV-23) 2013    Tdap 09/15/2014    Zoster Recombinant 2018       Objective:     Patient Vitals for the past 24 hrs:   Temp Pulse Resp BP SpO2   21 1539 99 °F (37.2 °C) 78 18 (!) 140/61 94 %   21 1417 -- -- -- -- 95 %   21 1314 -- 74 16 (!) 143/62 98 %   21 1241 -- 77 15 125/62 98 %   21 1226 -- 74 15 117/63 98 %   21 1221 -- 72 15 (!) 118/58 97 %   21 1216 -- 72 16 120/60 98 %   21 1211 98.5 °F (36.9 °C) 79 14 (!) 121/59 97 %   21 0944 -- 68 16 (!) 162/77 98 %   21 0929 -- 67 16 (!) 159/72 98 %   08/09/21 0924 -- 65 16 (!) 173/79 98 %   08/09/21 0919 -- 60 16 (!) 182/83 99 %   08/09/21 0914 -- 79 16 (!) 183/86 99 %   08/09/21 0911 -- 65 16 (!) 189/91 99 %   08/09/21 0749 97.3 °F (36.3 °C) 67 16 (!) 158/73 96 %     Oxygen Therapy  O2 Sat (%): 94 % (08/09/21 1539)  Pulse via Oximetry: 98 beats per minute (08/09/21 1417)  O2 Device: None (Room air) (08/09/21 1539)  O2 Flow Rate (L/min): 2 l/min (08/09/21 1241)    Estimated body mass index is 42.27 kg/m² as calculated from the following:    Height as of this encounter: 5' 8\" (1.727 m). Weight as of this encounter: 126.1 kg (278 lb). Intake/Output Summary (Last 24 hours) at 8/9/2021 1558  Last data filed at 8/9/2021 1238  Gross per 24 hour   Intake 1800 ml   Output 250 ml   Net 1550 ml         Physical Exam:    General:    BMI 42. No overt distress. Head:  Normocephalic, atraumatic  Eyes:  Extraocular muscle seems intact  HENT:    Moist mucous membranes  Neck:  Supple. No JVD  CV:   RRR. No m/r/g. No JVD  Lungs:   Entry bilateral lower lobe otherwise CTAB. No wheezing, rhonchi, or rales. Appears even, unlabored  Abdomen: Bowel sounds present. Soft, nontender, nondistended. Extremities: Warm and dry. No cyanosis or clubbing. Edema present. Site of surgery immobilized. Surgical site examination per primary team.  Skin:     No rashes. Normal turgor. Normal coloration  Neuro:  AOx3, moving all 4 extremities except the range of motion limited at the site of surgery. Speech normal.  Psych:  Normal mood and affect. Alert and oriented x3    Data Ordered and Personally Reviewed:    Last lab reviewed: Patient has CKD his baseline with last creatinine around 1.5.     Last 24hr Labs:  Recent Results (from the past 24 hour(s))   SARS-COV-2    Collection Time: 08/09/21  7:45 AM   Result Value Ref Range    SARS-CoV-2 Please find results under separate order     COVID-19 RAPID TEST    Collection Time: 08/09/21  7:45 AM   Result Value Ref Range Specimen source Nasopharyngeal      COVID-19 rapid test Not detected NOTD     GLUCOSE, POC    Collection Time: 08/09/21  8:45 AM   Result Value Ref Range    Glucose (POC) 103 (H) 65 - 100 mg/dL    Performed by [facility]        All Micro Results     Procedure Component Value Units Date/Time    COVID-19 RAPID TEST [[id number]] Collected: 08/09/21 0745    Order Status: Completed Specimen: Nasopharyngeal Updated: 08/09/21 0816     Specimen source Nasopharyngeal        COVID-19 rapid test Not detected        Comment:      The specimen is NEGATIVE for SARS-CoV-2, the novel coronavirus associated with COVID-19. A negative result does not rule out COVID-19. This test has been authorized by the FDA under an Emergency Use Authorization (EUA) for use by authorized laboratories. Fact sheet for Healthcare Providers: ConventionUpdate.co.nz  Fact sheet for Patients: ConventionUpdate.co.nz       Methodology: Isothermal Nucleic Acid Amplification               Other Studies:  No results found.     Current Meds:  Current Facility-Administered Medications   Medication Dose Route Frequency    [START ON 8/10/2021] allopurinoL (ZYLOPRIM) tablet 300 mg  300 mg Oral DAILY    cetirizine (ZYRTEC) tablet 10 mg   (Patient Supplied)  10 mg Oral BID    [START ON 8/10/2021] atorvastatin (LIPITOR) tablet 10 mg  10 mg Oral DAILY    famotidine (PEPCID) tablet 20 mg  20 mg Oral BID    gabapentin (NEURONTIN) capsule 600 mg  600 mg Oral QHS    [Held by provider] metFORMIN (GLUCOPHAGE) tablet 500 mg  500 mg Oral BID WITH MEALS    [START ON 8/10/2021] losartan (COZAAR) tablet 50 mg  50 mg Oral DAILY    [START ON 8/10/2021] potassium chloride (KLOR-CON) tablet 20 mEq  20 mEq Oral DAILY    [START ON 8/10/2021] torsemide (DEMADEX) tablet 10 mg  10 mg Oral DAILY    alcohol 62% (NOZIN) nasal  1 Ampule  1 Ampule Topical Q12H    0.9% sodium chloride infusion  100 mL/hr IntraVENous CONTINUOUS    sodium chloride (NS) flush 5-40 mL  5-40 mL IntraVENous Q8H    sodium chloride (NS) flush 5-40 mL  5-40 mL IntraVENous PRN    ceFAZolin (ANCEF) 2 g/20 mL in sterile water IV syringe  2 g IntraVENous Q8H    celecoxib (CELEBREX) capsule 200 mg  200 mg Oral Q12H    HYDROmorphone (DILAUDID) injection 1 mg  1 mg IntraVENous Q3H PRN    naloxone (NARCAN) injection 0.2-0.4 mg  0.2-0.4 mg IntraVENous Q10MIN PRN    dexamethasone (DECADRON) 10 mg/mL injection 10 mg  10 mg IntraVENous ONCE    ondansetron (ZOFRAN ODT) tablet 4 mg  4 mg Oral Q4H PRN    diphenhydrAMINE (BENADRYL) capsule 25 mg  25 mg Oral Q4H PRN    [START ON 8/10/2021] senna-docusate (PERICOLACE) 8.6-50 mg per tablet 2 Tablet  2 Tablet Oral DAILY    aspirin delayed-release tablet 81 mg  81 mg Oral Q12H    HYDROcodone-acetaminophen (NORCO) 7.5-325 mg per tablet 1 Tablet  1 Tablet Oral Q4H PRN    insulin lispro (HUMALOG) injection   SubCUTAneous TIDAC    acetaminophen (TYLENOL) tablet 1,000 mg  1,000 mg Oral Q6H PRN       Signed:  Betti Rinne, MD    Part of this note may have been written by using a voice dictation software. The note has been proof read but may still contain some grammatical/other typographical errors.

## 2021-08-09 NOTE — INTERVAL H&P NOTE
Update History & Physical    The Patient's History and Physical of July 28,   H&P was reviewed with the patient and I examined the patient. There was no change. The surgical site was confirmed by the patient and me. Plan:  The risk, benefits, expected outcome, and alternative to the recommended procedure have been discussed with the patient. Patient understands and wants to proceed with the procedure.     Electronically signed by Pablito Vasques MD on 8/9/2021 at 10:12 AM

## 2021-08-09 NOTE — PROGRESS NOTES
08/09/21 1417   Oxygen Therapy   O2 Sat (%) 95 %   Pulse via Oximetry 98 beats per minute   O2 Device None (Room air)   Joint Camp Notes Reviewed. Pt working on IS. Pt encouraged to do 10 breaths per hour while awake on IS. Good NPC. No respiratory distress noted at this time. No complications noted at this time.  Pt states he did not bring his CPAP

## 2021-08-09 NOTE — ANESTHESIA PREPROCEDURE EVALUATION
Anesthetic History   No history of anesthetic complications            Review of Systems / Medical History  Patient summary reviewed, nursing notes reviewed and pertinent labs reviewed    Pulmonary        Sleep apnea           Neuro/Psych   Within defined limits           Cardiovascular    Hypertension: well controlled              Exercise tolerance: >4 METS     GI/Hepatic/Renal     GERD: well controlled           Endo/Other    Diabetes: well controlled, type 2    Morbid obesity and arthritis     Other Findings              Physical Exam    Airway  Mallampati: II  TM Distance: 4 - 6 cm  Neck ROM: normal range of motion   Mouth opening: Normal     Cardiovascular    Rhythm: regular  Rate: normal         Dental  No notable dental hx       Pulmonary  Breath sounds clear to auscultation               Abdominal         Other Findings            Anesthetic Plan    ASA: 3  Anesthesia type: spinal      Post-op pain plan if not by surgeon: peripheral nerve block single      Anesthetic plan and risks discussed with: Patient

## 2021-08-09 NOTE — PROGRESS NOTES
Problem: Mobility Impaired (Adult and Pediatric)  Goal: *Acute Goals and Plan of Care (Insert Text)  Outcome: Progressing Towards Goal  Note: GOALS (1-4 days):  (1.)Mr. Ashley Elmore will move from supine to sit and sit to supine  in bed with STAND BY ASSIST.  (2.)Mr. Ashley Elmore will transfer from bed to chair and chair to bed with STAND BY ASSIST using the least restrictive device. (3.)Mr. Ashley Elmore will ambulate with STAND BY ASSIST for 200 feet with the least restrictive device. (4.)Mr. Ashley Elmore will ambulate up/down 1 steps with left railing with MINIMAL ASSIST with device as needed. (5.)Mr. Ashley Elmore will increase right knee ROM to 5°-80°.  ________________________________________________________________________________________________       PHYSICAL THERAPY JOINT CAMP TKA: Initial Assessment and PM 8/9/2021  OBSERVATION: Hospital Day: 1  Payor: Cynthia Luu / Plan: Juan Miguel Thompson / Product Type: [employer] /      NAME/AGE/GENDER: Kevin Massey II is a 78 y.o. male   PRIMARY DIAGNOSIS:  Primary osteoarthritis of right knee [M17.11]   Procedure(s) and Anesthesia Type:     * RIGHT KNEE ARTHROPLASTY TOTAL ROBOTIC ASSISTED PEDRO/IVETH  /SPINAL ADDUCTOR CANAL BLOCK - Spinal (Right)  ICD-10: Treatment Diagnosis:    Pain in Right Knee (M25.561)  Stiffness of Right Knee, Not elsewhere classified (M25.661)  Difficulty in walking, Not elsewhere classified (R26.2)      ASSESSMENT:     Mr. Ashley Elmore presents with decreased strength and range of motion right lower extremity and with decreased independence with functional mobility s/p right TKA. Pt will benefit from skilled PT interventions to maximize independence with functional mobility and TKA management. Pt did well with assessment and exercises. Pt instructed not to get up without assistance. Hope to progress mobility and exercises in the morning. Pt plans to discharge to home with continued therapy for follow up.      This section established at most recent assessment   PROBLEM LIST (Impairments causing functional limitations):  Decreased Strength  Decreased ADL/Functional Activities  Decreased Transfer Abilities  Decreased Ambulation Ability/Technique  Decreased Flexibility/Joint Mobility  Edema/Girth  Decreased [place] with Home Exercise Program   INTERVENTIONS PLANNED: (Benefits and precautions of physical therapy have been discussed with the patient.)  Bed Mobility  Cold  Gait Training  Home Exercise Program (HEP)  Range of Motion (ROM)  Therapeutic Activites  Therapeutic Exercise/Strengthening  Transfer Training     TREATMENT PLAN: Frequency/Duration: Follow patient BID for duration of hospital stay to address above goals. Rehabilitation Potential For Stated Goals: Good     RECOMMENDED REHABILITATION/EQUIPMENT: (at time of discharge pending progress): Continue Skilled Therapy and Home Health: Physical Therapy. HISTORY:   History of Present Injury/Illness (Reason for Referral):  Pt s/p total knee arthroplasty on 8/9/21  Past Medical History/Comorbidities:   Mr. Parag Gordon  has a past medical history of Allergic rhinitis, Cervicalgia (5/14/2013), Chronic LBP (9/25/2013), CKD (chronic kidney disease) stage 3, GFR 30-59 ml/min ([facility]) (04/12/2016), Combined hyperlipidemia, COVID-19 (01/03/2021), COVID-19 vaccine series completed (04/01/2021), DDD (degenerative disc disease), cervical, Dysmetabolic syndrome X (4/14/3205), GERD (gastroesophageal reflux disease), GERD (gastroesophageal reflux disease), Gout (5/14/2013), HTN (hypertension), Low HDL (under 40), Migraine, NAFLD (nonalcoholic fatty liver disease), Nausea & vomiting, Obesity (5/14/2013), PRATIBHA (obstructive sleep apnea), and Prediabetes (2012).   Mr. Parag Gordon  has a past surgical history that includes hx cervical diskectomy (2004); hx lap cholecystectomy (2005); hx tonsillectomy (1951); hx cholecystectomy (2005); hx colonoscopy; hx endoscopy (2004); and hx lumbar laminectomy (2015). Social History/Living Environment:      Prior Level of Function/Work/Activity:  indepednent   Number of Personal Factors/Comorbidities that affect the Plan of Care: 1-2: MODERATE COMPLEXITY   EXAMINATION:   Most Recent Physical Functioning:      Gross Assessment  AROM: Within functional limits (left LE)  Strength: Generally decreased, functional (left LE)        RLE AROM  R Knee Flexion: 60  R Knee Extension: 10            Bed Mobility  Supine to Sit: Contact guard assistance    Transfers  Sit to Stand: Contact guard assistance;Minimum assistance  Stand to Sit: Contact guard assistance  Bed to Chair: Contact guard assistance;Minimum assistance    Balance  Sitting: Intact  Standing: With support              Weight Bearing Status  Right Side Weight Bearing: As tolerated  Distance (ft): 100 Feet (ft)  Ambulation - Level of Assistance: Contact guard assistance  Assistive Device: Walker, rolling  Speed/Ania: Delayed  Step Length: Left shortened;Right shortened  Stance: Right decreased  Gait Abnormalities: Antalgic;Decreased step clearance  Interventions: Safety awareness training;Verbal cues     Braces/Orthotics:     Right Knee Cold  Type: Cryocuff      Body Structures Involved:  Joints  Muscles Body Functions Affected: Movement Related Activities and Participation Affected: Mobility  Self Care   Number of elements that affect the Plan of Care: 4+: HIGH COMPLEXITY   CLINICAL PRESENTATION:   Presentation: Stable and uncomplicated: LOW COMPLEXITY   CLINICAL DECISION MAKIN [facility] [address] [id number] AM-PAC 6 Clicks   Basic Mobility Inpatient Short Form  How much difficulty does the patient currently have. .. Unable A Lot A Little None   1. Turning over in bed (including adjusting bedclothes, sheets and blankets)? [] 1   [] 2   [x] 3   [] 4   2. Sitting down on and standing up from a chair with arms ( e.g., wheelchair, bedside commode, etc.)   [] 1   [] 2   [x] 3   [] 4   3.   Moving from lying on back to sitting on the side of the bed? [] 1   [] 2   [x] 3   [] 4   How much help from another person does the patient currently need. .. Total A Lot A Little None   4. Moving to and from a bed to a chair (including a wheelchair)? [] 1   [] 2   [x] 3   [] 4   5. Need to walk in hospital room? [] 1   [] 2   [x] 3   [] 4   6. Climbing 3-5 steps with a railing? [] 1   [] 2   [x] 3   [] 4   © 2007, Trustees of [address] [place] [address] [address] [address], under license to [employer]. All rights reserved     Score:  Initial: 18 Most Recent: X (Date: -- )    Interpretation of Tool:  Represents activities that are increasingly more difficult (i.e. Bed mobility, Transfers, Gait). Medical Necessity:     Patient is expected to demonstrate progress in   strength, range of motion, and functional technique   to   decrease assistance required with functional mobility and TKA managment  . Reason for Services/Other Comments:  Patient continues to require skilled intervention due to   Inability to complete functional mobility and TKA management independently  . Use of outcome tool(s) and clinical judgement create a POC that gives a: Clear prediction of patient's progress: LOW COMPLEXITY            TREATMENT:   (In addition to Assessment/Re-Assessment sessions the following treatments were rendered)     Pre-treatment Symptoms/Complaints:  knee pain mild  Pain Initial:      Post Session:  5   assessment  Therapeutic Exercise: (10 Minutes):  Exercises per grid below to improve mobility and strength. Required minimal visual, verbal, and manual cues to promote proper body alignment, promote proper body posture, and promote proper body mechanics. Progressed range and repetitions as indicated.       Date:  8/9 Date:   Date:     ACTIVITY/EXERCISE AM PM AM PM AM PM     []  []  []  []  []  []   Ankle Pumps  10       Quad Sets  10       Gluteal Sets  10       Hip ABd/ADduction  10       Straight Leg Raises  10       Knee Slides  10       Short Arc Quads Chair Slides                           B = bilateral; AA = active assistive; A = active; P = passive      Treatment/Session Assessment:     Response to Treatment:  did well. Education:  [x] Home Exercises  [x] Fall Precautions  [x] Use of Cold Therapy Unit [] D/C Instruction Review  [x] Knee Prosthesis Review  [x] Walker Management/Safety [] Adaptive Equipment as Needed  [] No pillow under knee       Interdisciplinary Collaboration:   Occupational Therapist  Registered Nurse    After treatment position/precautions:   Up in chair  Bed/Chair-wheels locked  Bed in low position  Caregiver at bedside  Call light within reach  Family at bedside    Compliance with Program/Exercises: Will assess as treatment progresses. Recommendations/Intent for next treatment session:  Treatment next visit will focus on increasing Mr. Sondra Standard independence with bed mobility, transfers, gait training, strength/ROM exercises, modalities for pain, and patient education.       Total Treatment Duration:  PT Patient Time In/Time Out  Time In: 1415  Time Out: [address], PT

## 2021-08-09 NOTE — PERIOP NOTES
TRANSFER - OUT REPORT:    Verbal report given to receiving nurse Carmela(name) on Samir Blue II being transferred to [facility](unit) for routine progression of care       Report consisted of patient's Situation, Background, Assessment and   Recommendations(SBAR). Information from the following report(s) OR Summary, Procedure Summary, Intake/Output and MAR was reviewed with the receiving nurse. Opportunity for questions and clarification was provided.       Patient transported with:   O2 @ 2 liters  Tech

## 2021-08-09 NOTE — ADDENDUM NOTE
Addendum  created 08/09/21 1325 by Lianet Linder CRNA    Flowsheet accepted, Intraprocedure Flowsheets edited, Orders acknowledged in Narrator

## 2021-08-09 NOTE — ANESTHESIA PROCEDURE NOTES
Paged Dr. Raymon Colon to call unit. Updated that scheduled induction is here. No complaints of leaking of fluid or vaginal bleeding. States having possible maia akbar contractions. Positive fetal movement. FHR reactive. [place] having rare contractions. SVE 2-3/70/-2. Orders received to start pitocin. Pt unsure of pain management at this time. States she does not want epidural.  Possibly wanting IV pain medications or nitrous. Order received for rapid covid test. Pt having complaints of heart burn. Orders received. Peripheral Block    Start time: 8/9/2021 9:12 AM  End time: 8/9/2021 9:13 AM  Performed by: Jaclyn Main MD  Authorized by: Jaclyn Main MD       Pre-procedure: Indications: at surgeon's request and post-op pain management    Preanesthetic Checklist: patient identified, risks and benefits discussed, site marked, timeout performed, anesthesia consent given and patient being monitored    Timeout Time: 09:12 EDT          Block Type:   Block Type:   Adductor canal  Laterality:  Right  Monitoring:  Standard ASA monitoring, continuous pulse ox, frequent vital sign checks, heart rate, oxygen and responsive to questions  Injection Technique:  Single shot  Procedures: ultrasound guided    Patient Position: supine  Prep: chlorhexidine    Location:  Mid thigh  Needle Type:  Stimuplex  Needle Gauge:  21 G  Needle Localization:  Ultrasound guidance and anatomical landmarks  Medication Injected:  Ropivacaine (NAROPIN) 2 mg/mL (0.2 %) injection, 40 mg  dexamethasone (DECADRON) 4 mg/mL injection, 5 mg  Med Admin Time: 8/9/2021 9:13 AM    Assessment:  Number of attempts:  1  Injection Assessment:  Incremental injection every 5 mL, local visualized surrounding nerve on ultrasound, negative aspiration for blood, no paresthesia, no intravascular symptoms and ultrasound image on chart  Patient tolerance:  Patient tolerated the procedure well with no immediate complications

## 2021-08-09 NOTE — ANESTHESIA PROCEDURE NOTES
Spinal Block    Start time: 8/9/2021 10:03 AM  End time: 8/9/2021 10:07 AM  Performed by: Tien Howard MD  Authorized by: Tien Howard MD     Pre-procedure:   Indications: primary anesthetic  Preanesthetic Checklist: patient identified, risks and benefits discussed, anesthesia consent, site marked, patient being monitored and timeout performed    Timeout Time: 10:03 EDT          Spinal Block:   Patient Position:  Seated  Prep Region:  Lumbar  Prep: chlorhexidine      Location:  L3-4  Technique:  Single shot    Local Dose (mL):  3    Needle:   Needle Type:  Pencan  Needle Gauge:  25 G  Attempts:  1      Events: CSF confirmed, no blood with aspiration and no paresthesia        Assessment:  Insertion:  Uncomplicated  Patient tolerance:  Patient tolerated the procedure well with no immediate complications

## 2021-08-09 NOTE — PROGRESS NOTES
TRANSFER - IN REPORT:    Verbal report received from [address], RN on [facility]  being received from PACU for routine post - op      Report consisted of patients Situation, Background, Assessment and   Recommendations(SBAR). Information from the following report(s) SBAR, Kardex, OR Summary, Procedure Summary, Intake/Output, MAR and Recent Results was reviewed with the receiving nurse. Opportunity for questions and clarification was provided. Assessment completed upon patients arrival to unit and care assumed.

## 2021-08-09 NOTE — PROGRESS NOTES
Problem: Self Care Deficits Care Plan (Adult)  Goal: *Acute Goals and Plan of Care (Insert Text)  Outcome: Progressing Towards Goal  Note: GOALS:   DISCHARGE GOALS (in preparation for going home/rehab):  3 days  1. Mr. Katya Cool will perform one lower body dressing activity with stand by assist required to demonstrate improved functional mobility and safety. 2.  Mr. Katya Cool will perform one lower body bathing activity with stand by assist required to demonstrate improved functional mobility and safety. 3.  Mr. Katya Cool will perform toileting/toilet transfer with stand by assist to demonstrate improved functional mobility and safety. 4.  Mr. Katya Cool will perform shower transfer with stand by assist to demonstrate improved functional mobility and safety. JOINT CAMP OCCUPATIONAL THERAPY TKA: Initial Assessment and Daily Note 8/9/2021  OBSERVATION: Hospital Day: 1  Payor: [employer] / Plan: Inge Ahumada / Product Type: Man[employer] Medicare /      NAME/AGE/GENDER: Wilmer Thorne II is a 78 y.o. male   PRIMARY DIAGNOSIS:  Primary osteoarthritis of right knee [M17.11]   Procedure(s) and Anesthesia Type:     * RIGHT KNEE ARTHROPLASTY TOTAL ROBOTIC ASSISTED PEDRO/IVETH  /SPINAL ADDUCTOR CANAL BLOCK - Spinal (Right)  ICD-10: Treatment Diagnosis:    Generalized Muscle Weakness (M62.81)  Other lack of cordination (R27.8)      ASSESSMENT:     Mr. Katya Cool is s/p Right TKA and presents with decreased weight bearing on R LE and decreased independence with functional mobility and activities of daily living as compared to baseline level of function and safety. Patient would benefit from skilled Occupational Therapy to maximize independence and safety with self-care task and functional mobility. Pt would also benefit from education on adaptive equipment and safety precautions in preparation for going home.       Patient able to don clothes at edge of bed with assist. Mobilized from bed to recliner using a rolling walker with assist. Should progress well with ADL's tomorrow. This section established at most recent assessment   PROBLEM LIST (Impairments causing functional limitations):  Decreased Strength  Decreased ADL/Functional Activities  Decreased Transfer Abilities  Increased Pain  Increased Fatigue  Decreased Flexibility/Joint Mobility  Decreased Knowledge of Precautions   INTERVENTIONS PLANNED: (Benefits and precautions of occupational therapy have been discussed with the patient.)  Activities of daily living training  Adaptive equipment training  Balance training  Clothing management  Donning&doffing training  Theraputic activity     TREATMENT PLAN: Frequency/Duration: Follow patient 1-2tx to address above goals. Rehabilitation Potential For Stated Goals: Good     RECOMMENDED REHABILITATION/EQUIPMENT: (at time of discharge pending progress): Continue Skilled Therapy. OCCUPATIONAL PROFILE AND HISTORY:   History of Present Injury/Illness (Reason for Referral): Pt presents this date s/p (Right) TKA. Past Medical History/Comorbidities:   Mr. Matias Davis  has a past medical history of Allergic rhinitis, Cervicalgia (5/14/2013), Chronic LBP (9/25/2013), CKD (chronic kidney disease) stage 3, GFR 30-59 ml/min ([facility]) (04/12/2016), Combined hyperlipidemia, COVID-19 (01/03/2021), COVID-19 vaccine series completed (04/01/2021), DDD (degenerative disc disease), cervical, Dysmetabolic syndrome X (8/87/6024), GERD (gastroesophageal reflux disease), GERD (gastroesophageal reflux disease), Gout (5/14/2013), HTN (hypertension), Low HDL (under 40), Migraine, NAFLD (nonalcoholic fatty liver disease), Nausea & vomiting, Obesity (5/14/2013), PRATIBHA (obstructive sleep apnea), and Prediabetes (2012).   Mr. Matias Davis  has a past surgical history that includes hx cervical diskectomy (2004); hx lap cholecystectomy (2005); hx tonsillectomy (1951); hx cholecystectomy (2005); hx colonoscopy; hx endoscopy (2004); and hx lumbar laminectomy (1/2015). Social History/Living Environment:   Home Environment: Private residence  # Steps to Enter: 1  Rails to Enter: No  One/Two Story Residence: Two story, live on 1st floor  # of Interior Steps: 15  Interior Rails: Left  Lift Chair Available: No  Living Alone: No  Support Systems: Spouse/Significant Other/Partner  Patient Expects to be Discharged toVF Cor[de-identified]ration  Current DME Used/Available at Home: Other (comment); Shower chair;Walker, rolling (high commode)  Tub or Shower Type: Shower    Prior Level of Function/Work/Activity:  Independent prior. Number of Personal Factors/Comorbidities that affect the Plan of Care: Brief history (0):  LOW COMPLEXITY   ASSESSMENT OF OCCUPATIONAL PERFORMANCE[de-identified]   Most Recent Physical Functioning:   Balance  Sitting: Intact  Standing: With support       Gross Assessment  AROM: Within functional limits (left LE)  Strength: Generally decreased, functional (left LE)            Coordination  Fine Motor Skills-Upper: Left Intact; Right Intact  Gross Motor Skills-Upper: Left Intact; Right Intact         Mental Status  Neurologic State: Alert  Orientation Level: Oriented X4  Cognition: Appropriate decision making  Perception: Appears intact          RLE AROM  R Knee Flexion: 60  R Knee Extension: 10     Basic ADLs (From Assessment) Complex ADLs (From Assessment)   Basic ADL  Feeding: Independent  Oral Facial Hygiene/Grooming: Setup  Bathing: Minimum assistance  Upper Body Dressing: Setup  Lower Body Dressing: Moderate assistance  Toileting:  Moderate assistance     Grooming/Bathing/Dressing Activities of Daily Living                       Functional Transfers  Toilet Transfer : Contact guard assistance  Shower Transfer: Minimum assistance     Bed/Mat Mobility  Supine to Sit: Contact guard assistance  Sit to Stand: Contact guard assistance;Minimum assistance  Stand to Sit: Contact guard assistance  Bed to Chair: Contact guard assistance;Minimum assistance Physical Skills Involved:  Range of Motion  Balance  Strength Cognitive Skills Affected (resulting in the inability to perform in a timely and safe manner):  [facility] Psychosocial Skills Affected:  WFL   Number of elements that affect the Plan of Care: 1-3:  LOW COMPLEXITY   CLINICAL DECISION MAKIN[address] [place] [address] AM-PAC 6 Clicks   Daily Activity Inpatient Short Form  How much help from another person does the patient currently need. .. Total A Lot A Little None   1. Putting on and taking off regular lower body clothing? [] 1   [x] 2   [] 3   [] 4   2. Bathing (including washing, rinsing, drying)? [] 1   [x] 2   [] 3   [] 4   3. Toileting, which includes using toilet, bedpan or urinal?   [] 1   [x] 2   [] 3   [] 4   4. Putting on and taking off regular upper body clothing? [] 1   [] 2   [] 3   [x] 4   5. Taking care of personal grooming such as brushing teeth? [] 1   [] 2   [] 3   [x] 4   6. Eating meals? [] 1   [] 2   [] 3   [x] 4   © , Trustees of [address] [place] [address], under license to [employer]. All rights reserved     Score:  Initial: 18 Most Recent: X (Date: -- )    Interpretation of Tool:  Represents activities that are increasingly more difficult (i.e. Bed mobility, Transfers, Gait). Medical Necessity:     Skilled intervention continues to be required due to Deficits noted above. Reason for Services/Other Comments:  Patient continues to require skilled intervention due to   New TKA  . Use of outcome tool(s) and clinical judgement create a POC that gives a: MODERATE COMPLEXITY            TREATMENT:   (In addition to Assessment/Re-Assessment sessions the following treatments were rendered)     Pre-treatment Symptoms/Complaints:    Pain: Initial:   Pain Intensity 1: 0  Post Session:  0     Self Care: (10): Procedure(s) (per grid) utilized to improve and/or restore self-care/home management as related to dressing, toileting, and grooming.  Required minimal verbal and tactile cueing to facilitate activities of daily living skills. Initial evaluation 5 mintues. Treatment/Session Assessment:     Response to Treatment:  Good, sitting up in recliner. Education:  [] Home Exercises  [x] Fall Precautions  [] Hip Precautions [] Going Home Video  [x] Knee/Hip Prosthesis Review  [x] Walker Management/Safety [x] Adaptive Equipment as Needed       Interdisciplinary Collaboration:   Physical Therapist  Occupational Therapist  Registered Nurse    After treatment position/precautions:   Up in chair  Bed/Chair-wheels locked  Caregiver at bedside  Call light within reach  RN notified     Compliance with Program/Exercises: Compliant all of the time, Will assess as treatment progresses. Recommendations/Intent for next treatment session:  Treatment next visit will focus on increasing Mr. Christianne Rogers independence with bed mobility, transfers, self care, functional mobility, modalities for pain, and patient education.       Total Treatment Duration:  OT Patient Time In/Time Out  Time In: 1400  Time Out: [address], OT

## 2021-08-09 NOTE — ANESTHESIA POSTPROCEDURE EVALUATION
Procedure(s):  RIGHT KNEE ARTHROPLASTY TOTAL ROBOTIC ASSISTED PEDRO/IVETH  /SPINAL ADDUCTOR CANAL BLOCK.    spinal    Anesthesia Post Evaluation      Multimodal analgesia: multimodal analgesia used between 6 hours prior to anesthesia start to PACU discharge  Patient location during evaluation: PACU  Patient participation: complete - patient participated  Level of consciousness: awake and awake and alert  Pain management: adequate  Airway patency: patent  Anesthetic complications: no  Cardiovascular status: acceptable  Respiratory status: acceptable  Hydration status: acceptable  Post anesthesia nausea and vomiting:  controlled      INITIAL Post-op Vital signs:   Vitals Value Taken Time   /62 08/09/21 1241   Temp 36.9 °C (98.5 °F) 08/09/21 1211   Pulse 77 08/09/21 1241   Resp 15 08/09/21 1241   SpO2 98 % 08/09/21 1241

## 2021-08-09 NOTE — PROGRESS NOTES
Received to room from PACU. Right knee clean, dry, and intact. Plantar/dorsiflexion moderate, pulses 2+, sensation present in bilateral lower extremities. Ice applied to right knee. Discussed diet and pain control. Oriented to room and bed functions. Bed locked, in lowest position, call light within reach.

## 2021-08-09 NOTE — PROGRESS NOTES
Care Management Interventions  PCP Verified by CM: Yes  Mode of Transport at Discharge: Self  Transition of Care Consult (CM Consult): [address]: Yes  Discharge Durable Medical Equipment: Yes (RW)  Physical Therapy Consult: Yes  Occupational Therapy Consult: Yes  Current Support Network: Own Home, Lives with Spouse  Confirm Follow Up Transport: Family  The Plan for Transition of Care is Related to the Following Treatment Goals : Return to independent function. The Patient and/or Patient Representative was Provided with a Choice of Provider and Agrees with the Discharge Plan?: Yes  Freedom of Choice List was Provided with Basic Dialogue that Supports the Patient's Individualized Plan of Care/Goals, Treatment Preferences and Shares the Quality Data Associated with the Providers?: Yes  Discharge Location  Discharge Placement: Home with home health    Patient is a 78y.o. year old male admitted for Right TKA . Patient plans to return home on discharge. Order received to arrange home health. Patient without preference towards agency. Referral sent to [facility]. Patient requesting we arrange a walker. Referral sent to [address] [place] [address]. delivered to the hospital room prior to discharge. Will follow until discharge.

## 2021-08-09 NOTE — OP NOTES
[address] [place] [address] Robotic Assisted Total Knee Arthroplasty: Posterior Cruciate Retaining       Patient:Williams Alfred II   : 1945  Medical Record QSYXR  Pre-operative Diagnosis:  Primary osteoarthritis of right knee [M17.11]  Post-operative Diagnosis: Primary osteoarthritis of right knee [M17.11]  Location: [address] [place] [address]  Surgeon: Tatiana Alpers, MD   Assistant: Amber bain    Anesthesia: Spinal and FNB    Indications: Patient has end stage arthritis. They have tried and failed conservative management. Procedure:Procedure(s) (LRB):  RIGHT KNEE ARTHROPLASTY TOTAL ROBOTIC ASSISTED PEDRO/IVETH  /SPINAL ADDUCTOR CANAL BLOCK (Right)            CPT- 91972- Total knee arthroplasty           93126- Other procedures on musculoskeletal system            0055T- Computer assisted surgical navigation   The complexity of the total joint surgery requires the use of a first assistant for positioning, retraction and expertise in closure. Tourniquet Time: 0 minutes  EBL: 250 cc  Findings: moderate degenerative arthritis of medial compartment with loss of cartilage in weight bearing compartments of the knee, no patellar osteophytes with good patella cartilage, posterior femoral osteophytes   BMI: Body mass index is 42.27 kg/m². Manolo Washburn II was brought to the operating room and positioned on the operating table. He was anesthestized with anesthesia. IV antibiotics were administered. Prior to the incision being made a timeout was called identifying the patient, procedure ,operative side and surgeon The operative leg was prepped and draped in the usual sterile manner. An anterior longitudinal incision was accomplished just medial to the tibial tubercle and extending approximal 6 centimeters proximal to the superior pole of the patella. A medial parapatellar capsular incision was performed.  The medial capsular flap was elevated around to the insertion of the semimembranous tendon. The patella was everted and the knee flexed and externally rotated. The medial and external menisci were excised. The lateral half of the fat pad excised and the patella femoral ligament was released. The anterior cruciate ligament was resect and the posterior cruciate ligament was retained. The femoral and tibial arrays were pinned in place and registered with the [employer]. The patient landmarks were collected and the tibial and femoral checkpoints were registered and verified. The preresection balancing was performed. The distal femur was addressed first. Utilizing the [facility] robotic arm the distal femoral cut was made. The anterior and posterior cuts were then made. The osteophytes were removed from the tibial and femoral surfaces. The tibia was then addressed. The [employer] robotic arm was then used to make the measured resection of the tibia. The tibia was sized. The tibial base plate was pinned into place with the appropriate external rotation and stem site prepared. A trial femoral component and poly was placed. A preliminary range of motion was accomplished with the trial components. The patient was found to obtain full extension as well as appropriate flexion. The patient's ligaments were stable in flexion and extension to medial and lateral stressing and the alignment was through the appropriate mechanical axis. Additional surgical procedures included: none. A trial reduction of the patella revealed appropriate tracking through the patellofemoral groove with no lateral retinacular release being accomplished. All trial components were removed. The real implants were opened: Sizes listed below. The knee was irrigated. There were no femoral deficiencies. There were no tibial deficiencies. No augmentation was utilized. The tibial component was impacted into place. The femoral component was cemented into place.     Wilmer Thorne II knee was placed through range of motion and noted to be stable as mentioned above with the trail components. The wound was dry, therefore no drain was used. The operative knee was injected with 60 cc of Naropin, 10 cc's of morphine and 1 cc of 30 mg of Toradol. The knee was then soaked with a diluted betadine solution for approximately 3 min. This was then thoroughly irrigated. The capsular layer was closed using a #1 PDS suture. Then, 1 gram (100 mg/ml) of Transexamic Acid was injected into the joint space. The subcutaneous layers were closed using 2-0 Stratafix. Finally the skin was closed using 3-0 Vicryl and staples which were applied in occlusive fashion and sterile bandage applied. An Iceman cryo pad was applied on the operative leg. Sponge count and needle counts were correct. Aries Horta II left the operating room     Implants:   Implant Name Type Inv.  Item Serial No.  Lot No. LRB No. Used Action   CEMENT BONE 40GM HI VISC PALACOS R - [id number]  CEMENT BONE 40GM HI VISC PALACOS R  [facility]_WD [id number] Right 1 Implanted   FEM KNE KYLIE CR SZ 5 RT -- TRIATHLON - [id number]  FEM KNE KYLIE CR SZ 5 RT -- TRIATHLON  [employer] HOW[employer]WD [id number] 1 Implanted   BASEPLATE TIB SZ 6 [id number] ML77MM KNEE TRITANIUM 4 CRUCFRM - [id number]  BASEPLATE TIB SZ 6 [id number] ML77MM KNEE TRITANIUM 4 CRUCFRM  [employer] HOW_WD [id number] Right 1 Implanted   INSERT TIB SZ 6 THK9MM UNIV KNEE POLYETH CNDYL STBL MAINOR NEUT - [id number]  INSERT TIB SZ 6 THK9MM UNIV KNEE POLYETH CNDYL STBL MAINOR NEUT  [employer] HOW_WD [id number] 1 Implanted         Signed By: Libertad Bond MD   8/9/2021,  11:28 AM

## 2021-08-10 VITALS
HEART RATE: 94 BPM | SYSTOLIC BLOOD PRESSURE: 148 MMHG | WEIGHT: 278 LBS | OXYGEN SATURATION: 95 % | RESPIRATION RATE: 16 BRPM | BODY MASS INDEX: 42.13 KG/M2 | HEIGHT: 68 IN | DIASTOLIC BLOOD PRESSURE: 76 MMHG | TEMPERATURE: 98.7 F

## 2021-08-10 LAB
GLUCOSE BLD STRIP.AUTO-MCNC: 182 MG/DL (ref 65–100)
HGB BLD-MCNC: 12.7 G/DL (ref 13.6–17.2)
SERVICE CMNT-IMP: ABNORMAL

## 2021-08-10 PROCEDURE — 99218 HC RM OBSERVATION: CPT

## 2021-08-10 PROCEDURE — 97535 SELF CARE MNGMENT TRAINING: CPT

## 2021-08-10 PROCEDURE — 74011250637 HC RX REV CODE- 250/637: Performed by: PHYSICIAN ASSISTANT

## 2021-08-10 PROCEDURE — 82962 GLUCOSE BLOOD TEST: CPT

## 2021-08-10 PROCEDURE — 36415 COLL VENOUS BLD VENIPUNCTURE: CPT

## 2021-08-10 PROCEDURE — 99024 POSTOP FOLLOW-UP VISIT: CPT | Performed by: ORTHOPAEDIC SURGERY

## 2021-08-10 PROCEDURE — 97116 GAIT TRAINING THERAPY: CPT

## 2021-08-10 PROCEDURE — 85018 HEMOGLOBIN: CPT

## 2021-08-10 PROCEDURE — 65270000029 HC RM PRIVATE

## 2021-08-10 PROCEDURE — 97110 THERAPEUTIC EXERCISES: CPT

## 2021-08-10 RX ORDER — ASPIRIN 81 MG/1
81 TABLET ORAL EVERY 12 HOURS
Qty: 70 TABLET | Refills: 0 | Status: SHIPPED | OUTPATIENT
Start: 2021-08-10 | End: 2021-09-14

## 2021-08-10 RX ORDER — HYDROCODONE BITARTRATE AND ACETAMINOPHEN 7.5; 325 MG/1; MG/1
1 TABLET ORAL
Qty: 30 TABLET | Refills: 0 | Status: SHIPPED | OUTPATIENT
Start: 2021-08-10 | End: 2021-08-13 | Stop reason: SDUPTHER

## 2021-08-10 RX ADMIN — Medication 81 MG: at 09:17

## 2021-08-10 RX ADMIN — ALLOPURINOL 300 MG: 300 TABLET ORAL at 09:19

## 2021-08-10 RX ADMIN — Medication 1 AMPULE: at 09:19

## 2021-08-10 RX ADMIN — CELECOXIB 200 MG: 200 CAPSULE ORAL at 09:18

## 2021-08-10 RX ADMIN — ATORVASTATIN CALCIUM 10 MG: 10 TABLET, FILM COATED ORAL at 09:18

## 2021-08-10 RX ADMIN — Medication 10 ML: at 06:20

## 2021-08-10 RX ADMIN — Medication 10 MG: at 09:26

## 2021-08-10 RX ADMIN — TORSEMIDE 10 MG: 20 TABLET ORAL at 09:21

## 2021-08-10 RX ADMIN — POTASSIUM CHLORIDE 20 MEQ: 750 TABLET, EXTENDED RELEASE ORAL at 09:18

## 2021-08-10 RX ADMIN — HYDROCODONE BITARTRATE AND ACETAMINOPHEN 1 TABLET: 7.5; 325 TABLET ORAL at 09:58

## 2021-08-10 RX ADMIN — HYDROCODONE BITARTRATE AND ACETAMINOPHEN 1 TABLET: 7.5; 325 TABLET ORAL at 06:20

## 2021-08-10 RX ADMIN — FAMOTIDINE 20 MG: 20 TABLET ORAL at 09:18

## 2021-08-10 RX ADMIN — LOSARTAN POTASSIUM 50 MG: 50 TABLET, FILM COATED ORAL at 09:19

## 2021-08-10 NOTE — PROGRESS NOTES
Problem: Self Care Deficits Care Plan (Adult)  Goal: *Acute Goals and Plan of Care (Insert Text)  Outcome: Progressing Towards Goal  Note: GOALS:   DISCHARGE GOALS (in preparation for going home/rehab):  3 days  1. Mr. Laurie Manzano will perform one lower body dressing activity with stand by assist required to demonstrate improved functional mobility and safety. -GOAL MET 8/10/2021     2. Mr. Laurie Manzano will perform one lower body bathing activity with stand by assist required to demonstrate improved functional mobility and safety. -GOAL MET 8/10/2021   3. Mr. Laurie Manzano will perform toileting/toilet transfer with stand by assist to demonstrate improved functional mobility and safety. -GOAL MET 8/10/2021   4. Mr. Laurie Manzano will perform shower transfer with stand by assist to demonstrate improved functional mobility and safety. -GOAL MET 8/10/2021        JOINT CAMP OCCUPATIONAL THERAPY TKA: Daily Note and Discharge 8/10/2021  INPATIENT: Hospital Day: 2  Payor: Marcelo Lara / Plan: Angely Morton / Product Type: [employer] /      NAME/AGE/GENDER: Job Spencer II is a 78 y.o. male   PRIMARY DIAGNOSIS:  Primary osteoarthritis of right knee [M17.11]   Procedure(s) and Anesthesia Type:     * RIGHT KNEE ARTHROPLASTY TOTAL ROBOTIC ASSISTED PEDRO/IVETH  /SPINAL ADDUCTOR CANAL BLOCK - Spinal (Right)  ICD-10: Treatment Diagnosis:    · Generalized Muscle Weakness (M62.81)  · Other lack of cordination (R27.8)      ASSESSMENT:      Mr. Laurie Manzano is s/p Right TKA and presents with decreased weight bearing on R LE and decreased independence with functional mobility and activities of daily living. Patient completed shower and dressing as charted below in ADL grid and is ambulating with rolling walker and supervision assist.  Patient has met 4/4 goals and plans to return home with good family support. Will do well at home for self cares and transfers during ADL's.  D/C OT for acute deficits.        This section established at most recent assessment   PROBLEM LIST (Impairments causing functional limitations):  1. Decreased Strength  2. Decreased ADL/Functional Activities  3. Decreased Transfer Abilities  4. Increased Pain  5. Increased Fatigue  6. Decreased Flexibility/Joint Mobility  7. Decreased Knowledge of Precautions   INTERVENTIONS PLANNED: (Benefits and precautions of occupational therapy have been discussed with the patient.)  1. Activities of daily living training  2. Adaptive equipment training  3. Balance training  4. Clothing management  5. Donning&doffing training  6. Theraputic activity     TREATMENT PLAN: Frequency/Duration: Follow patient 1-2tx to address above goals. Rehabilitation Potential For Stated Goals: Good     RECOMMENDED REHABILITATION/EQUIPMENT: (at time of discharge pending progress): Continue Skilled Therapy. OCCUPATIONAL PROFILE AND HISTORY:   History of Present Injury/Illness (Reason for Referral): Pt presents this date s/p (Right) TKA. Past Medical History/Comorbidities:   Mr. Antonio Serna  has a past medical history of Allergic rhinitis, Cervicalgia (5/14/2013), Chronic LBP (9/25/2013), CKD (chronic kidney disease) stage 3, GFR 30-59 ml/min ([facility]) (04/12/2016), Combined hyperlipidemia, COVID-19 (01/03/2021), COVID-19 vaccine series completed (04/01/2021), DDD (degenerative disc disease), cervical, Dysmetabolic syndrome X (0/35/0951), GERD (gastroesophageal reflux disease), GERD (gastroesophageal reflux disease), Gout (5/14/2013), HTN (hypertension), Low HDL (under 40), Migraine, NAFLD (nonalcoholic fatty liver disease), Nausea & vomiting, Obesity (5/14/2013), PRATIBHA (obstructive sleep apnea), and Prediabetes (2012). Mr. Antonio Serna  has a past surgical history that includes hx cervical diskectomy (2004); hx lap cholecystectomy (2005); hx tonsillectomy (1951); hx cholecystectomy (2005); hx colonoscopy; hx endoscopy (2004); and hx lumbar laminectomy (1/2015).   Social History/Living Environment:   Home Environment: Private residence  # Steps to Enter: 1  Rails to [employer]: No  One/Two Story Residence: Two story, live on 1st floor  # of Interior Steps: 15  Height of Each Step (in): 15 inches  Interior Rails: Left  Lift Chair Available: No  Living Alone: No  Support Systems: Spouse/Significant Other/Partner  Patient Expects to be Discharged to[de-identified] [employer]  Current DME Used/Available at Home: Shower chair;Walker, rolling  Tub or Shower Type: Shower    Prior Level of Function/Work/Activity:  Independent prior. Number of Personal Factors/Comorbidities that affect the Plan of Care: Brief history (0):  LOW COMPLEXITY   ASSESSMENT OF OCCUPATIONAL PERFORMANCE[de-identified]   Most Recent Physical Functioning:   Balance  Sitting: Intact  Standing: With support                    Coordination  Fine Motor Skills-Upper: Left Intact; Right Intact  Gross Motor Skills-Upper: Left Intact; Right Intact         Mental Status  Neurologic State: Alert  Orientation Level: Oriented X4  Cognition: Appropriate decision making  Perception: Appears intact                Basic ADLs (From Assessment) Complex ADLs (From Assessment)   Basic ADL  Feeding: Independent  Oral Facial Hygiene/Grooming: Independent  Bathing: Supervision  Type of Bath: Chlorhexidine (CHG), Full, Shower  Upper Body Dressing: Setup  Lower Body Dressing: Minimum assistance  Toileting: Supervision     Grooming/Bathing/Dressing Activities of Daily Living                       Functional Transfers  Toilet Transfer : Supervision  Shower Transfer: Stand-by assistance     Bed/Mat Mobility  Supine to Sit: Supervision  Sit to Stand: Supervision  Stand to Sit: Supervision  Bed to Chair: Supervision         Physical Skills Involved:  1. Range of Motion  2. Balance  3. Strength Cognitive Skills Affected (resulting in the inability to perform in a timely and safe manner):  1. [facility] Psychosocial Skills Affected:  1.  [facility]   Number of elements that affect the Plan of Care: 1-3:  LOW COMPLEXITY   CLINICAL DECISION MAKING:   [facility] AM-PAC 6 Clicks   Daily Activity Inpatient Short Form  How much help from another person does the patient currently need. .. Total A Lot A Little None   1. Putting on and taking off regular lower body clothing? [] 1   [] 2   [x] 3   [] 4   2. Bathing (including washing, rinsing, drying)? [] 1   [] 2   [x] 3   [] 4   3. Toileting, which includes using toilet, bedpan or urinal?   [] 1   [] 2   [x] 3   [] 4   4. Putting on and taking off regular upper body clothing? [] 1   [] 2   [] 3   [x] 4   5. Taking care of personal grooming such as brushing teeth? [] 1   [] 2   [] 3   [x] 4   6. Eating meals? [] 1   [] 2   [] 3   [x] 4   © 2007, Trustees of [facility], under license to [employer]. All rights reserved     Score:  Initial: 18 Most Recent: 21 (Date: 8/10/2021 )    Interpretation of Tool:  Represents activities that are increasingly more difficult (i.e. Bed mobility, Transfers, Gait). Medical Necessity:     · Skilled intervention continues to be required due to Deficits noted above. Reason for Services/Other Comments:  · Patient continues to require skilled intervention due to   · New TKA  · . Use of outcome tool(s) and clinical judgement create a POC that gives a: MODERATE COMPLEXITY            TREATMENT:   (In addition to Assessment/Re-Assessment sessions the following treatments were rendered)     Pre-treatment Symptoms/Complaints:    Pain: Initial:   Pain Intensity 1: 5  Post Session:  0     Self Care: (38): Procedure(s) (per grid) utilized to improve and/or restore self-care/home management as related to dressing, bathing, toileting, grooming and transfers. Required minimal verbal and tactile cueing to facilitate activities of daily living skills. Treatment/Session Assessment:     Response to Treatment:  Good, sitting up in recliner.     Education:  [] Home Exercises  [x] Fall Precautions  [] Hip Precautions [] Going Home Video  [x] Knee/Hip Prosthesis Review  [x] Walker Management/Safety [x] Adaptive Equipment as Needed       Interdisciplinary Collaboration:   o Physical Therapist  o Occupational Therapist  o Registered Nurse    After treatment position/precautions:   o Up in chair  o Bed/Chair-wheels locked  o Caregiver at bedside  o Call light within reach  o RN notified     Compliance with Program/Exercises: Compliant all of the time, Will assess as treatment progresses. Recommendations/Intent for next treatment session:  D/C OT for acute deficits.       Total Treatment Duration:  OT Patient Time In/Time Out  Time In: 0810  Time Out: [facility] , OT

## 2021-08-10 NOTE — PROGRESS NOTES
08/09/21 2158   Oxygen Therapy   O2 Sat (%) 95 %   Pulse via Oximetry 88 beats per minute   O2 Device None (Room air)   Patient placed on continuous sat monitor. Data cleared and alarms set. No distress noted at this time. Patient did not bring home CPAP therefore 3L NC is at bedside within reach. Patient wishes to wait until he's moved into the bed before placing it on.

## 2021-08-10 NOTE — DISCHARGE INSTRUCTIONS
Patient Education        Total Knee Replacement: What to Expect at [address] [place] [address] Drive had a total knee replacement. The doctor replaced the worn ends of the bones that connect to your knee (thighbone and lower leg bone) with plastic and metal parts. When you leave the hospital, you should be able to move around with a walker or crutches. But you will need someone to help you at home for the next few weeks or until you have more energy and can move around better. You will go home with a bandage and stitches, staples, skin glue, or tape strips. Change the bandage as your doctor tells you to. If you have stitches or staples, your doctor will remove them 10 to 21 days after your surgery. Glue or tape strips will fall off on their own over time. You may still have some mild pain, and the area may be swollen for 3 to 6 months after surgery. Your knee will continue to improve for 6 to 12 months. You will probably use a walker for 1 to 3 weeks and then use crutches. When you are ready, you can use a cane. You will probably be able to walk on your own in 4 to 8 weeks. You will need to do months of physical rehabilitation (rehab) after a knee replacement. Rehab will help you strengthen the muscles of the knee and help you regain movement. After you recover, your artificial knee will allow you to do normal daily activities with less pain or no pain at all. You may be able to hike, dance, ride a bike, and play golf. Talk to your doctor about whether you can do more strenuous activities. Always tell your caregivers that you have an artificial knee. How long it will take to walk on your own, return to normal activities, and go back to work depends on your health and how well your rehabilitation (rehab) program goes. The better you do with your rehab exercises, the quicker you will get your strength and movement back. This care sheet gives you a general idea about how long it will take for you to recover.  But each person recovers at a different pace. Follow the steps below to get better as quickly as possible. How can you care for yourself at home? Activity    · Rest when you feel tired. You may take a nap, but don't stay in bed all day. When you sit, use a chair with arms. You can use the arms to help you stand up.     · Work with your physical therapist to find the best way to exercise. What you can do as your knee heals will depend on whether your new knee is cemented or uncemented. You may not be able to do certain things for a while if your new knee is uncemented.     · After your knee has healed enough, you can do more strenuous activities with caution. ? You can golf, but use a golf cart. And don't wear shoes with spikes. ? You can bike on a flat road or on a stationary bike. Avoid biking up hills. ? Your doctor may suggest that you stay away from activities that put stress on your knee. These include tennis, badminton, contact sports like football, jumping (such as in basketball), jogging, and running. ? Avoid activities where you might fall.     · Do not sit for more than 1 hour at a time. Get up and walk around for a while before you sit again. If you must sit for a long time, prop up your leg with a chair or footstool. This will help you avoid swelling.     · Ask your doctor when you can drive again. It may take up to 8 weeks after knee replacement surgery before it's safe for you to drive.     · When you get into a car, sit on the edge of the seat. Then pull in your legs, and turn to face the front.     · You should be able to do many everyday activities 3 to 6 weeks after your surgery. You will probably need to take 4 to 16 weeks off from work. When you can go back to work depends on the type of work you do and how you feel.     · Ask your doctor when it is okay for you to have sex.     · For 12 weeks, do not lift anything heavier than 10 pounds and do not lift weights.    Diet    · By the time you leave the hospital, you should be eating your normal diet. If your stomach is upset, try bland, low-fat foods like plain rice, broiled chicken, toast, and yogurt. Your doctor may suggest that you take iron and vitamin supplements.     · Drink plenty of fluids (unless your doctor tells you not to).   · Eat healthy foods, and watch your portion sizes. Try to stay at your ideal weight. Too much weight puts more stress on your new knee.     · You may notice that your bowel movements are not regular right after your surgery. This is common. Try to avoid constipation and straining with bowel movements. You may want to take a fiber supplement every day. If you have not had a bowel movement after a couple of days, ask your doctor about taking a mild laxative. Medicines    · Your doctor will tell you if and when you can restart your medicines. You will also get instructions about taking any new medicines.     · If you take aspirin or some other blood thinner, ask your doctor if and when to start taking it again. Make sure that you understand exactly what your doctor wants you to do.     · Your doctor may give you a blood-thinning medicine to prevent blood clots. If you take a blood thinner, be sure you get instructions about how to take your medicine safely. Blood thinners can cause serious bleeding problems. This medicine could be in pill form or as a shot (injection). If a shot is needed, your doctor will tell you how to do this.     · Be safe with medicines. Take pain medicines exactly as directed. ? If the doctor gave you a prescription medicine for pain, take it as prescribed. ? If you are not taking a prescription pain medicine, ask your doctor if you can take an over-the-counter medicine. ? Plan to take your pain medicine 30 minutes before exercises.  It is easier to prevent pain before it starts than to stop it after it has started.     · If you think your pain medicine is making you sick to your stomach:  ? Take your medicine after meals (unless your doctor has told you not to). ? Ask your doctor for a different pain medicine.     · If your doctor prescribed antibiotics, take them as directed. Do not stop taking them just because you feel better. You need to take the full course of antibiotics. Incision care    · If your doctor told you how to care for your cut (incision), follow your doctor's instructions. You will have a dressing over the cut. A dressing helps the incision heal and protects it. Your doctor will tell you how to take care of this.     · If you did not get instructions, follow this general advice:  ? If you have strips of tape on the cut the doctor made, leave the tape on for a week or until it falls off.  ? If you have stitches or staples, your doctor will tell you when to come back to have them removed. ? If you have skin glue on the cut, leave it on until it falls off. Skin glue is also called skin adhesive or liquid stitches. ? Change the bandage every day. ? Wash the area daily with warm water, and pat it dry. Don't use hydrogen peroxide or alcohol. They can slow healing. ? You may cover the area with a gauze bandage if it oozes fluid or rubs against clothing. ? You may shower 24 to 48 hours after surgery. Pat the incision dry. Don't swim or take a bath for the first 2 weeks, or until your doctor tells you it is okay. Exercise    · Your rehab program will give you a number of exercises to do to help you get back your knee's range of motion and strength. Always do them as your therapist tells you. Ice    · For pain and swelling, put ice or a cold pack on the area for 10 to 20 minutes at a time. Put a thin cloth between the ice and your skin. Other instructions    · Keep wearing your compression stockings as your doctor says. These help to prevent blood clots.  How long you'll have to wear them depends on your activity level and the amount of swelling.     · Carry a medical alert card that says you have an artificial joint. You have metal pieces in your knee. These may set off some airport metal detectors. Follow-up care is a key part of your treatment and safety. Be sure to make and go to all appointments, and call your doctor if you are having problems. It's also a good idea to know your test results and keep a list of the medicines you take. When should you call for help? Call 911 anytime you think you may need emergency care. For example, call if:    · You passed out (lost consciousness).     · You have severe trouble breathing.     · You have sudden chest pain and shortness of breath, or you cough up blood. Call your doctor now or seek immediate medical care if:    · You have signs of infection, such as:  ? Increased pain, swelling, warmth, or redness. ? Red streaks leading from the incision. ? Pus draining from the incision. ? A fever.     · You have signs of a blood clot, such as:  ? Pain in your calf, back of the knee, thigh, or groin. ? Redness and swelling in your leg or groin.     · Your incision comes open and begins to bleed, or the bleeding increases.     · You have pain that does not get better after you take pain medicine. Watch closely for changes in your health, and be sure to contact your doctor if:    · You do not have a bowel movement after taking a laxative. Where can you learn more? Go to http://www.gray.com/  Enter T054 in the search box to learn more about \"Total Knee Replacement: What to Expect at Home. \"  Current as of: November 16, 2020               Content Version: 12.8  © 2006-2021 [employer]. Care instructions adapted under license by [employer] (which disclaims liability or warranty for this information).  If you have questions about a medical condition or this instruction, always ask your healthcare professional. [place] [address] any warranty or liability for your use of this information. [facility]   Patient Discharge Instructions    Melissa Cordero II / [id number] : 1945    Admitted 2021 Discharged: 8/10/2021     IF YOU HAVE ANY PROBLEMS ONCE YOU ARE AT HOME CALL THE FOLLOWING NUMBERS:   Main office number: [phone]      Medications    · The medications you are to continue on are listed on the medication reconciliation sheet. · Narcotic pain medications as well as supplemental iron can cause constipation. If this occurs try stopping the narcotic pain medication and/or the iron. · It is important that you take the medication exactly as they are prescribed. · Medications which increase your risk of blood clots are listed to stop for 5 weeks after surgery as well as medications or supplements which increase your risk of bleeding complications. · Keep your medication in the bottles provided by the pharmacist and keep a list of the medication names, dosages, and times to be taken in your wallet. · Do not take other medications without consulting your doctor. Important Information    Do NOT smoke as this will greatly increase your risk of infection! Resume your prehospital diet. If you have excessive nausea or vomitting call your doctor's office     Leg swelling and warmth is normal for 6 months after surgery. If you experience swelling in your leg elevate you leg while laying down with your toes above your heart. If you have sudden onset severe swelling with leg pain call our office. Use Crescencio Hose stockings until we see you in the office for your follow up appointment. The stitches deep inside take approximately 6 months to dissolve. There will be sharp shooting, stinging and burning pain. This is normal and will resolve between 3-6 months after surgery. Difficulty sleeping is normal following total Knee and Hip replacement. You may try melatonin, an over-the-counter sleep aid or benadryl to help with sleep.  Most patients will resume sleeping through the night 8 weeks after surgery. Home Physical Therapy is arranged. Home Health will contact you within 48 hrs of discharge that you have chosen. If you have not received a call within this time frame please contact that provider you chose. You should be given this information before you leave the hospital.     You are at a risk for falls. Use the rolling walker when walking. Patients who have had a joint replacement should not drive if they are still taking narcotic pain mediation during the daytime hours. Most patients wean themselves off of pain medication within 2-5 weeks after surgery. When to Call the office    - If you have a temperature greater then 101  - Uncontrolled vomiting   - Loose control of your bladder or bowel function  - Are unable to bear any wieght   - Need a pain medication refill     Information obtained by :  I understand that if any problems occur once I am at home I am to contact my physician. I understand and acknowledge receipt of the instructions indicated above.                                                                                                                                            Physician's or R.N.'s Signature                                                                  Date/Time                                                                                                                                              Patient or Representative Signature                                                          Date/Time

## 2021-08-10 NOTE — PROGRESS NOTES
[facility] Hospitalist Consult   Admit Date:  2021  7:29 AM   Name:  Kiersten Saenz II   Age:  78 y.o. Sex:  male  :  1945   MRN:  [id number]     Presenting Complaint: No chief complaint on file. Reason(s) for Admission: Primary osteoarthritis of right knee [M17.11]  Osteoarthritis of right knee [M17.11]     Reason for consult: Management of lower extremity swelling, diabetes, hypertension. History of Presenting Illness:   Kiersten Saenz II is a 78 y.o. male with history of diabetes/prediabetes, hypertension, obstructive sleep apnea and lower extremity swelling admitted to Ortho service and status post RIGHT KNEE ARTHROPLASTY TOTAL ROBOTIC ASSISTED PEDRO[facility]  [facility]. Medicine service consulted for management of hypertension, diabetes and lower extremity swelling. # Diabetes mellitus: As per patient his diabetes is so well controlled now he has prediabetes. He takes Metformin at home. #Hypertension: As per patient he lost significant weight after Covid infection in January that his losartan use is now 50 mg instead of 100 mg. #Restless leg syndrome: Uses gabapentin at home. #Obstructive sleep apnea: We will CPAP at home. August 10: Ready to go home. Did really good with physical therapy. Some pain at the site of surgery. . Denies any nausea, vomiting, chest pain or palpitations. Rest review of system negative except mentioned above. Assessment & Plan:     #Diabetes mellitus: We will hold Metformin and will use sliding scale high sensitivity. August 10: Continue sliding scale. Resume Metformin on discharge. #Hypertension: Continue home medication. As needed labetalol ordered. #PRATIBHA: CPAP ordered. Family will bring CPAP from home. August 10: Discussed in need of CPAP. #Restless leg syndrome: Continue home gabapentin. #Lower extremity edema: Continue home torsemide.         Principal Problem:    Status post right knee replacement (8/9/2021)    Surgery and surgery related management including DVT prophylaxis, drop in hemoglobin and pain management per primary team.    Rest management per primary team.    Please notify medicine team if drop in hemoglobin is more than expected for the surgery. Thanks for consultation. We will continue to follow along on daily basis. Please notify on-call medicine physician with questions or if patient clinical condition changes. If patient gets discharged home then he needs to follow-up with primary care in 1 week of discharge. If patient goes home then he should be discharged on his home medication based on today's evaluation. Plan discussed with the patient. Objective:     Patient Vitals for the past 24 hrs:   Temp Pulse Resp BP SpO2   08/10/21 0654 98.7 °F (37.1 °C) 94 16 (!) 148/76 95 %   08/10/21 0319 98 °F (36.7 °C) 85 16 (!) 152/85 97 %   08/10/21 0015 98.2 °F (36.8 °C) 88 17 (!) 141/72 95 %   08/09/21 2158 -- -- -- -- 95 %   08/09/21 1939 98 °F (36.7 °C) 91 17 126/64 92 %   08/09/21 1539 99 °F (37.2 °C) 78 18 (!) 140/61 94 %     Oxygen Therapy  O2 Sat (%): 95 % (08/10/21 0654)  Pulse via Oximetry: 88 beats per minute (08/09/21 2158)  O2 Device: None (Room air) (08/10/21 0839)  O2 Flow Rate (L/min): 2 l/min (08/09/21 1241)    Estimated body mass index is 42.27 kg/m² as calculated from the following:    Height as of this encounter: 5' 8\" (1.727 m). Weight as of this encounter: 126.1 kg (278 lb). No intake or output data in the 24 hours ending 08/10/21 1432      Physical Exam:    General:    BMI 42. No overt distress. Head:  Normocephalic, atraumatic  Eyes:  Extraocular muscle seems intact  HENT:    Moist mucous membranes  Neck:  Supple. No JVD  CV:   RRR. No m/r/g. No JVD  Lungs:   Entry bilateral lower lobe otherwise CTAB. No wheezing, rhonchi, or rales. Appears even, unlabored  Abdomen: Bowel sounds present. Soft, nontender, nondistended. Extremities: Warm and dry. No cyanosis or clubbing. Edema present. Site of surgery immobilized. Surgical site examination per primary team.  Skin:     No rashes. Normal turgor. Normal coloration  Neuro:  AOx3, moving all 4 extremities except the range of motion limited at the site of surgery. Speech normal.  Psych:  Normal mood and affect. Alert and oriented x3    Data Ordered and Personally Reviewed:    Last lab reviewed: Patient has CKD his baseline with last creatinine around 1.5. Last 24hr Labs:  Recent Results (from the past 24 hour(s))   GLUCOSE, POC    Collection Time: 08/09/21  4:47 PM   Result Value Ref Range    Glucose (POC) 157 (H) 65 - 100 mg/dL    Performed by Brendan    HEMOGLOBIN    Collection Time: 08/09/21  7:45 PM   Result Value Ref Range    HGB 13.3 (L) 13.6 - 17.2 g/dL   GLUCOSE, POC    Collection Time: 08/09/21  9:44 PM   Result Value Ref Range    Glucose (POC) 182 (H) 65 - 100 mg/dL    Performed by [address]    Collection Time: 08/10/21  4:50 AM   Result Value Ref Range    HGB 12.7 (L) 13.6 - 17.2 g/dL   GLUCOSE, POC    Collection Time: 08/10/21  6:23 AM   Result Value Ref Range    Glucose (POC) 182 (H) 65 - 100 mg/dL    Performed by Car Ridley        All Micro Results     Procedure Component Value Units Date/Time    COVID-19 RAPID TEST [[id number]] Collected: 08/09/21 0745    Order Status: Completed Specimen: Nasopharyngeal Updated: 08/09/21 0816     Specimen source Nasopharyngeal        COVID-19 rapid test Not detected        Comment:      The specimen is NEGATIVE for SARS-CoV-2, the novel coronavirus associated with COVID-19. A negative result does not rule out COVID-19. This test has been authorized by the FDA under an Emergency Use Authorization (EUA) for use by authorized laboratories.         Fact sheet for Healthcare Providers: ConventionUpdate.co.nz  Fact sheet for Patients: ConventionUpdate.co.nz       Methodology: Isothermal Nucleic Acid Amplification               Other Studies:  No results found. Current Meds:  No current facility-administered medications for this encounter. Current Outpatient Medications   Medication Sig    HYDROcodone-acetaminophen (NORCO) 7.5-325 mg per tablet Take 1 Tablet by mouth every four (4) hours as needed (acute postoperative surgical pain) for up to 5 days. Max Daily Amount: 6 Tablets.  aspirin delayed-release 81 mg tablet Take 1 Tablet by mouth every twelve (12) hours for 35 days. Indications: DVT prophylaxis    acetaminophen (Tylenol Extra Strength) 500 mg tablet Take 1,000 mg by mouth every six (6) hours as needed for Pain.  metFORMIN (GLUCOPHAGE) 500 mg tablet Take 500 mg by mouth two (2) times daily (with meals). Indications: type 2 diabetes mellitus    famotidine (PEPCID) 20 mg tablet Take 20 mg by mouth two (2) times a day. Take / use AM day of surgery  per anesthesia protocols. Indications: gastroesophageal reflux disease    torsemide (DEMADEX) 10 mg tablet 1 tab po qam prn edema (Patient taking differently: Take 10 mg by mouth daily.)    allopurinol (ZYLOPRIM) 300 mg tablet 1 tab po qd (Patient taking differently: Take 300 mg by mouth daily. 1 tab po every day; Take / use AM day of surgery  per anesthesia protocols. Indications: treatment to prevent acute gout attack)    gabapentin (NEURONTIN) 300 mg capsule Take 1 Cap by mouth three (3) times daily. (Patient taking differently: Take 600 mg by mouth nightly. 2 capsules at bedtime)    losartan (COZAAR) 100 mg tablet TAKE 1 TABLET BY MOUTH EVERY DAY (Patient taking differently: Take 50 mg by mouth daily. TAKE 1 TABLET BY MOUTH EVERY DAY  Indications: high blood pressure)    potassium chloride (KLOR-CON) 10 mEq tablet Take 2 Tabs by mouth daily. (Patient taking differently: Take 20 mEq by mouth daily. Take 2 tablets daily; Take / use AM day of surgery  per anesthesia protocols.   Indications: prevention of low potassium in the blood)  atorvastatin (LIPITOR) 10 mg tablet Take 1 Tab by mouth nightly. (Patient taking differently: Take 10 mg by mouth daily. Take / use AM day of surgery  per anesthesia protocols. Indications: excessive fat in the blood, high cholesterol and high triglycerides)    cpap machine kit by Does Not Apply route. 13 cm with water    cetirizine (ZYRTEC) 10 mg tablet Take 10 mg by mouth two (2) times a day. Take / use AM day of surgery  per anesthesia protocols. Indications: inflammation of the nose due to an allergy    fluticasone propionate (CUTIVATE) 0.05 % topical cream Apply  to affected area two (2) times a day. To face for dry skin patches on temples (Patient not taking: Reported on 8/9/2021)    pantoprazole (PROTONIX) 20 mg tablet Take 20 mg by mouth as needed. Indications: gastroesophageal reflux disease (Patient not taking: Reported on 8/9/2021)    OTHER (Compounded Cream) Gabapentin 4%, Topiramate 2.5%, Lidocaine 2%, Prilocaine 2%,    Apply 1-2 pumps of pain cream to faocal pain area 2 times daily (Patient not taking: Reported on 8/9/2021)       Signed:  Rosa Isela Mata MD    Part of this note may have been written by using a voice dictation software. The note has been proof read but may still contain some grammatical/other typographical errors.

## 2021-08-10 NOTE — PROGRESS NOTES
Problem: Mobility Impaired (Adult and Pediatric)  Goal: *Acute Goals and Plan of Care (Insert Text)  Outcome: Progressing Towards Goal  Note: GOALS (1-4 days):  (1.)Mr. Antonio Serna will move from supine to sit and sit to supine  in bed with STAND BY ASSIST.  (2.)Mr. Antonio Serna will transfer from bed to chair and chair to bed with STAND BY ASSIST using the least restrictive device. (3.)Mr. Antonio Serna will ambulate with STAND BY ASSIST for 200 feet with the least restrictive device. (4.)Mr. Antonio Serna will ambulate up/down 1 steps with left railing with MINIMAL ASSIST with device as needed. (5.)Mr. Antonio Serna will increase right knee ROM to 5°-80°.  ________________________________________________________________________________________________       PHYSICAL THERAPY JOINT CAMP TKA: Daily Note, Treatment Day: 1st and AM 8/10/2021  INPATIENT: Hospital Day: 2  Payor: Kaykay Mcrae / Plan: Joe Bran / Product Type: [employer] /      NAME/AGE/GENDER: Nick Gotti II is a 78 y.o. male   PRIMARY DIAGNOSIS:  Primary osteoarthritis of right knee [M17.11]   Procedure(s) and Anesthesia Type:     * RIGHT KNEE ARTHROPLASTY TOTAL ROBOTIC ASSISTED PEDRO/IVETH  /SPINAL ADDUCTOR CANAL BLOCK - Spinal (Right)  ICD-10: Treatment Diagnosis:    · Pain in Right Knee (M25.561)  · Stiffness of Right Knee, Not elsewhere classified (M25.661)  · Difficulty in walking, Not elsewhere classified (R26.2)      ASSESSMENT:     Mr. Antonio Serna presents with decreased strength and range of motion right lower extremity and with decreased independence with functional mobility s/p right TKA. Pt will benefit from skilled PT interventions to maximize independence with functional mobility and TKA management. Pt did well with assessment and exercises. Pt instructed not to get up without assistance. Hope to progress mobility and exercises in the morning.  Pt plans to discharge to home with continued therapy for follow up.   8/10- pt up in chair on contact, already worked with OT. Pt worked on TKA exercises in the chair with verbal cues progressing with repetitions. Reviewed HEP and use of ice. Worked on gait training in the [place]anes with verbal cues progressing with distance. Practiced going up and down stairs with verbal cues. Pt returned to room and stayed up in chair with ice on knee and needs in reach. He plans to discharge to home with HHPT for follow up. This section established at most recent assessment   PROBLEM LIST (Impairments causing functional limitations):  1. Decreased Strength  2. Decreased ADL/Functional Activities  3. Decreased Transfer Abilities  4. Decreased Ambulation Ability/Technique  5. Decreased Flexibility/Joint Mobility  6. Edema/Girth  7. Decreased [place] with Home Exercise Program   INTERVENTIONS PLANNED: (Benefits and precautions of physical therapy have been discussed with the patient.)  1. Bed Mobility  2. Cold  3. Gait Training  4. Home Exercise Program (HEP)  5. Range of Motion (ROM)  6. Therapeutic Activites  7. Therapeutic Exercise/Strengthening  8. Transfer Training     TREATMENT PLAN: Frequency/Duration: Follow patient BID for duration of hospital stay to address above goals. Rehabilitation Potential For Stated Goals: Good     RECOMMENDED REHABILITATION/EQUIPMENT: (at time of discharge pending progress): Continue Skilled Therapy and Home Health: Physical Therapy.               HISTORY:   History of Present Injury/Illness (Reason for Referral):  Pt s/p total knee arthroplasty on 8/9/21  Past Medical History/Comorbidities:   Mr. Mo Richards  has a past medical history of Allergic rhinitis, Cervicalgia (5/14/2013), Chronic LBP (9/25/2013), CKD (chronic kidney disease) stage 3, GFR 30-59 ml/min ([facility]) (04/12/2016), Combined hyperlipidemia, COVID-19 (01/03/2021), COVID-19 vaccine series completed (04/01/2021), DDD (degenerative disc disease), cervical, Dysmetabolic syndrome X (1/82/1378), GERD (gastroesophageal reflux disease), GERD (gastroesophageal reflux disease), Gout (5/14/2013), HTN (hypertension), Low HDL (under 40), Migraine, NAFLD (nonalcoholic fatty liver disease), Nausea & vomiting, Obesity (5/14/2013), PRATIBHA (obstructive sleep apnea), and Prediabetes (2012). Mr. Steven Tatum  has a past surgical history that includes hx cervical diskectomy (2004); hx lap cholecystectomy (2005); hx tonsillectomy (1951); hx cholecystectomy (2005); hx colonoscopy; hx endoscopy (2004); and hx lumbar laminectomy (1/2015). Social History/Living Environment:   Home Environment: Private residence  # Steps to Enter: 1  One/Two Story Residence: Two story, live on 1st floor  Height of Each Step (in): 15 inches  Living Alone: No  Support Systems: Spouse/Significant Other/Partner  Patient Expects to be Discharged to[de-identified] [employer]  Current DME Used/Available at Home: Shower chair, Walker, rolling  Prior Level of Function/Work/Activity:  indepednent   Number of Personal Factors/Comorbidities that affect the Plan of Care: 1-2: MODERATE COMPLEXITY   EXAMINATION:   Most Recent Physical Functioning:                            Bed Mobility  Supine to Sit: Supervision    Transfers  Sit to Stand: Supervision  Stand to Sit: Supervision  Bed to Chair: Supervision    Balance  Sitting: Intact  Standing: Intact; With support         Gait Training: Yes    Weight Bearing Status  Right Side Weight Bearing: As tolerated  Distance (ft): 265 Feet (ft)  Ambulation - Level of Assistance: Supervision;Stand-by assistance  Assistive Device: Walker, rolling  Speed/Ania: Delayed  Step Length: Left shortened  Stance: Right decreased  Gait Abnormalities: Antalgic;Decreased step clearance  Number of Stairs Trained: 3  Stairs - Level of Assistance: Stand-by assistance  Rail Use: Both  Interventions: Safety awareness training;Verbal cues     Braces/Orthotics:     Right Knee Cold  Type: Cryocuff      Body Structures Involved:  1. Joints  2.  Muscles Body Functions Affected:  1. Movement Related Activities and Participation Affected:  1. Mobility  2. Self Care   Number of elements that affect the Plan of Care: 4+: HIGH COMPLEXITY   CLINICAL PRESENTATION:   Presentation: Stable and uncomplicated: LOW COMPLEXITY   CLINICAL DECISION MAKIN [facility] [address] [id number] AM-PAC 6 Clicks   Basic Mobility Inpatient Short Form  How much difficulty does the patient currently have. .. Unable A Lot A Little None   1. Turning over in bed (including adjusting bedclothes, sheets and blankets)? [] 1   [] 2   [x] 3   [] 4   2. Sitting down on and standing up from a chair with arms ( e.g., wheelchair, bedside commode, etc.)   [] 1   [] 2   [x] 3   [] 4   3. Moving from lying on back to sitting on the side of the bed? [] 1   [] 2   [x] 3   [] 4   How much help from another person does the patient currently need. .. Total A Lot A Little None   4. Moving to and from a bed to a chair (including a wheelchair)? [] 1   [] 2   [x] 3   [] 4   5. Need to walk in hospital room? [] 1   [] 2   [x] 3   [] 4   6. Climbing 3-5 steps with a railing? [] 1   [] 2   [x] 3   [] 4   © , Trustees of [address] [place] [address] [address] [id number], under license to [employer]. All rights reserved     Score:  Initial: 18 Most Recent: X (Date: -- )    Interpretation of Tool:  Represents activities that are increasingly more difficult (i.e. Bed mobility, Transfers, Gait). Medical Necessity:     · Patient is expected to demonstrate progress in   · strength, range of motion, and functional technique  ·  to   · decrease assistance required with functional mobility and TKA managment  · .  Reason for Services/Other Comments:  · Patient continues to require skilled intervention due to   · Inability to complete functional mobility and TKA management independently  · .    Use of outcome tool(s) and clinical judgement create a POC that gives a: Clear prediction of patient's progress: LOW COMPLEXITY            TREATMENT:   (In addition to Assessment/Re-Assessment sessions the following treatments were rendered)     Pre-treatment Symptoms/Complaints:  knee pain mild  Pain Initial:   Pain Intensity 1: 4  Post Session:  5   Gait Training (15 Minutes):  Gait training to improve and/or restore physical functioning as related to mobility and strength. Ambulated 265 Feet (ft) with Supervision;Stand-by assistance using a Walker, rolling and minimal Safety awareness training;Verbal cues related to their stance phase and stride length to promote proper body alignment and promote proper body posture. Instruction in performance of walker use and gait sequencing to correct stance phase and stride length. Therapeutic Exercise: (15 Minutes):  Exercises per grid below to improve mobility and strength. Required minimal visual, verbal, and manual cues to promote proper body alignment, promote proper body posture, and promote proper body mechanics. Progressed range and repetitions as indicated. Date:  8/9 Date:  8/10 Date:     ACTIVITY/EXERCISE AM PM AM PM AM PM     []  []  []  []  []  []   Ankle Pumps  10 15      Quad Sets  10 15      Gluteal Sets  10 15      Hip ABd/ADduction  10 15      Straight Leg Raises  10 15      Knee Slides  10 15      Short Arc Quads   15      Chair Slides   15                        B = bilateral; AA = active assistive; A = active; P = passive      Treatment/Session Assessment:     Response to Treatment:  did well, good progress, home today    Education:  [x] Home Exercises  [x] Fall Precautions  [x] Use of Cold Therapy Unit [] D/C Instruction Review  [x] Knee Prosthesis Review  [x] Walker Management/Safety [] Adaptive Equipment as Needed  [] No pillow under knee       Interdisciplinary Collaboration:   o Registered Nurse    After treatment position/precautions:   o Up in chair  o Bed/Chair-wheels locked  o Call light within reach  o Family at bedside    Compliance with Program/Exercises: Compliant all of the time. Recommendations/Intent for next treatment session:  Treatment next visit will focus on increasing Mr. Albino Scales independence with bed mobility, transfers, gait training, strength/ROM exercises, modalities for pain, and patient education.       Total Treatment Duration:  PT Patient Time In/Time Out  Time In: 0900  Time Out: [place] 3069, PT

## 2021-08-10 NOTE — PROGRESS NOTES
Patient IV removed. Discharge paperwork reviewed with time given for questions with answers provided. Patient was discharged with family present.

## 2021-08-10 NOTE — PROGRESS NOTES
August 10, 2021         Post Op day: 1 Day Post-OpProcedure(s) (LRB):  RIGHT KNEE ARTHROPLASTY TOTAL ROBOTIC ASSISTED PEDRO/IVETH Connelly ADDUCTOR CANAL BLOCK (Right)      Admit Date: 2021  Admit Diagnosis: Primary osteoarthritis of right knee [M17.11]; Osteoarthritis of right knee [M17.11]    LAB:    Recent Results (from the past 24 hour(s))   SARS-COV-2    Collection Time: 21  7:45 AM   Result Value Ref Range    SARS-CoV-2 Please find results under separate order     COVID-19 RAPID TEST    Collection Time: 21  7:45 AM   Result Value Ref Range    Specimen source Nasopharyngeal      COVID-19 rapid test Not detected NOTD     GLUCOSE, POC    Collection Time: 21  8:45 AM   Result Value Ref Range    Glucose (POC) 103 (H) 65 - 100 mg/dL    Performed by [facility]    GLUCOSE, POC    Collection Time: 21  4:47 PM   Result Value Ref Range    Glucose (POC) 157 (H) 65 - 100 mg/dL    Performed by Brendan    HEMOGLOBIN    Collection Time: 21  7:45 PM   Result Value Ref Range    HGB 13.3 (L) 13.6 - 17.2 g/dL   GLUCOSE, POC    Collection Time: 21  9:44 PM   Result Value Ref Range    Glucose (POC) 182 (H) 65 - 100 mg/dL    Performed by [address]    Collection Time: 08/10/21  4:50 AM   Result Value Ref Range    HGB 12.7 (L) 13.6 - 17.2 g/dL   GLUCOSE, POC    Collection Time: 08/10/21  6:23 AM   Result Value Ref Range    Glucose (POC) 182 (H) 65 - 100 mg/dL    Performed by Car Ridley      Vital Signs:    Patient Vitals for the past 8 hrs:   BP Temp Pulse Resp SpO2   08/10/21 0654 (!) 148/76 98.7 °F (37.1 °C) 94 -- 95 %   08/10/21 0319 (!) 152/85 98 °F (36.7 °C) 85 16 97 %   08/10/21 0015 (!) 141/72 98.2 °F (36.8 °C) 88 17 95 %     Temp (24hrs), Av.2 °F (36.8 °C), Min:97.3 °F (36.3 °C), Max:99 °F (37.2 °C)    Body mass index is 42.27 kg/m².   Pain Control:   Pain Assessment  Pain Scale 1: Numeric (0 - 10)  Pain Intensity 1: 5  Pain Location 1: Knee  Pain Orientation 1: Right  Pain Description 1: Aching  Pain Intervention(s) 1: Medication (see MAR)    Subjective: Doing well, No complaints, No SOB, No Chest Pain, No nausea or vomiting     Objective: Vital Signs are Stable, No Acute Distress, Alert and Oriented, Dressing is dry,  Neurovascular exam is normal.       PT/OT:            Assistive Device: Walker (comment)  RLE AROM  R Knee Flexion: 60  R Knee Extension: 10             Wieght Bearing Status: WBAT    Meds:  [unfilled]  [unfilled]  [unfilled]    Assessment:   Patient Active Problem List   Diagnosis Code    Migraine without status migrainosus, not intractable D35.905    Dysmetabolic syndrome X Z04.09    Hypogonadism, testicular E29.1    Low HDL (under 40) E78.6    Combined hyperlipidemia E78.2    Routine health maintenance Z00.00    Essential hypertension I10    Periodic limb movement disorder G47.61    Chronic venous insufficiency I87.2    PRATIBHA (obstructive sleep apnea) G47.33    Diabetes mellitus type 2, controlled ([facility]) E11.9    NAFLD (nonalcoholic fatty liver disease) K76.0    Obesity, morbid, BMI 40.0-49.9 ([facility]) E66.01    Chronic neck pain M54.2, G89.29    Idiopathic gout M10.00    CKD (chronic kidney disease) stage 3, GFR 30-59 ml/min ([facility]) N18.30    Chronic bilateral low back pain without sciatica M54.5, G89.29    Impaired fasting glucose R73.01    DDD (degenerative disc disease), lumbar M51.36    Chronic pain syndrome G89.4    Dyspepsia R10.13    Sciatica associated with disorder of lumbar spine M53.86    Status post right knee replacement Z96.651    Osteoarthritis of right knee M17.11             Plan: Continue Physical Therapy, Monitor labs, home today        Signed By: Rip James MD

## 2021-08-11 ENCOUNTER — HOME CARE VISIT (OUTPATIENT)
Dept: SCHEDULING | Facility: HOME HEALTH | Age: 76
End: 2021-08-11
Payer: MEDICARE

## 2021-08-11 VITALS
DIASTOLIC BLOOD PRESSURE: 72 MMHG | OXYGEN SATURATION: 98 % | TEMPERATURE: 97.4 F | HEART RATE: 62 BPM | SYSTOLIC BLOOD PRESSURE: 136 MMHG | RESPIRATION RATE: 18 BRPM

## 2021-08-11 PROCEDURE — G0151 HHCP-SERV OF PT,EA 15 MIN: HCPCS

## 2021-08-11 PROCEDURE — 400013 HH SOC

## 2021-08-13 ENCOUNTER — HOME CARE VISIT (OUTPATIENT)
Dept: SCHEDULING | Facility: HOME HEALTH | Age: 76
End: 2021-08-13
Payer: MEDICARE

## 2021-08-13 VITALS
TEMPERATURE: 97.8 F | RESPIRATION RATE: 17 BRPM | OXYGEN SATURATION: 98 % | SYSTOLIC BLOOD PRESSURE: 130 MMHG | DIASTOLIC BLOOD PRESSURE: 80 MMHG | HEART RATE: 70 BPM

## 2021-08-13 PROCEDURE — G0157 HHC PT ASSISTANT EA 15: HCPCS

## 2021-08-16 ENCOUNTER — HOME CARE VISIT (OUTPATIENT)
Dept: SCHEDULING | Facility: HOME HEALTH | Age: 76
End: 2021-08-16
Payer: MEDICARE

## 2021-08-16 VITALS
DIASTOLIC BLOOD PRESSURE: 66 MMHG | SYSTOLIC BLOOD PRESSURE: 130 MMHG | TEMPERATURE: 97.4 F | RESPIRATION RATE: 18 BRPM | HEART RATE: 69 BPM

## 2021-08-16 PROCEDURE — G0157 HHC PT ASSISTANT EA 15: HCPCS

## 2021-08-18 ENCOUNTER — HOME CARE VISIT (OUTPATIENT)
Dept: SCHEDULING | Facility: HOME HEALTH | Age: 76
End: 2021-08-18
Payer: MEDICARE

## 2021-08-18 VITALS
TEMPERATURE: 97.2 F | SYSTOLIC BLOOD PRESSURE: 124 MMHG | DIASTOLIC BLOOD PRESSURE: 68 MMHG | HEART RATE: 77 BPM | RESPIRATION RATE: 19 BRPM

## 2021-08-18 PROCEDURE — G0157 HHC PT ASSISTANT EA 15: HCPCS

## 2021-08-20 ENCOUNTER — HOME CARE VISIT (OUTPATIENT)
Dept: SCHEDULING | Facility: HOME HEALTH | Age: 76
End: 2021-08-20
Payer: MEDICARE

## 2021-08-20 VITALS
TEMPERATURE: 97.2 F | RESPIRATION RATE: 18 BRPM | SYSTOLIC BLOOD PRESSURE: 126 MMHG | HEART RATE: 79 BPM | DIASTOLIC BLOOD PRESSURE: 66 MMHG

## 2021-08-20 PROCEDURE — A6258 TRANSPARENT FILM >16<=48 IN: HCPCS

## 2021-08-20 PROCEDURE — MED10110 REMOVER,STAPLE,SKIN,STERILE

## 2021-08-20 PROCEDURE — G0157 HHC PT ASSISTANT EA 15: HCPCS

## 2021-08-20 PROCEDURE — A4450 NON-WATERPROOF TAPE: HCPCS

## 2021-08-23 ENCOUNTER — HOME CARE VISIT (OUTPATIENT)
Dept: SCHEDULING | Facility: HOME HEALTH | Age: 76
End: 2021-08-23
Payer: MEDICARE

## 2021-08-23 VITALS
DIASTOLIC BLOOD PRESSURE: 68 MMHG | TEMPERATURE: 97.5 F | HEART RATE: 76 BPM | RESPIRATION RATE: 18 BRPM | SYSTOLIC BLOOD PRESSURE: 136 MMHG

## 2021-08-23 PROCEDURE — G0157 HHC PT ASSISTANT EA 15: HCPCS

## 2021-08-25 ENCOUNTER — HOME CARE VISIT (OUTPATIENT)
Dept: SCHEDULING | Facility: HOME HEALTH | Age: 76
End: 2021-08-25
Payer: MEDICARE

## 2021-08-25 VITALS
DIASTOLIC BLOOD PRESSURE: 62 MMHG | HEART RATE: 76 BPM | SYSTOLIC BLOOD PRESSURE: 108 MMHG | TEMPERATURE: 98.2 F | RESPIRATION RATE: 18 BRPM

## 2021-08-25 PROCEDURE — G0151 HHCP-SERV OF PT,EA 15 MIN: HCPCS

## 2021-08-30 ENCOUNTER — HOSPITAL ENCOUNTER (OUTPATIENT)
Dept: PHYSICAL THERAPY | Age: 76
Discharge: HOME | End: 2021-08-30
Payer: MEDICARE

## 2021-08-30 PROCEDURE — 97161 PT EVAL LOW COMPLEX 20 MIN: CPT

## 2021-08-30 PROCEDURE — 97110 THERAPEUTIC EXERCISES: CPT

## 2021-08-31 NOTE — THERAPY EVALUATION
Fannie Mati Tatum II  : 1945  Primary: Karen Horan Medicare Advantage  Secondary:  [address]  at [address] include 234 beds at the [facility]  [address] , [address], [place]  [phone]   [phone]        OUTPATIENT PHYSICAL THERAPY:Initial Assessment 2021    ICD-10: Treatment Diagnosis: Pain in joint,right knee (M25.561)  Stiffness in joint, right knee (M25.661)  Effusion of joint, right knee (M25.461)  Precautions/Allergies:   Codeine, Lisinopril, and Ultram [tramadol]   TREATMENT PLAN:  Effective Dates: 2021 TO 10/30/2021 (60 days). Frequency/Duration: 2 times a week for 60 Day(s)     MEDICAL/REFERRING DIAGNOSIS:  Presence of right artificial knee joint [Z96.651]   DATE OF ONSET: Surgery Date: 21  REFERRING PHYSICIAN: Jesse Carbajal MD MD Orders: Olu Ku and Treat  Return MD Appointment: 21     INITIAL ASSESSMENT:  Mr. Steven Tatum presents with pain, swelling and joint stiffness following right TKA 21. Incision is clean with steristrips in place. Patient is ambulating with a rolling walker with mild antalgic gait. ROM is limited to 3 to 110 degrees . Pt may benefit from PT to address the following problem list.   PROBLEM LIST (Impacting functional limitations):  1. Decreased Strength  2. Decreased ADL/Functional Activities  3. Decreased Ambulation Ability/Technique  4. Increased Pain  5. Decreased Flexibility/Joint Mobility  6. Edema/Girth INTERVENTIONS PLANNED:  1. Cryotherapy  2. Electrical Stimulation  3. Gait Training  4. Home Exercise Program (HEP)  5. Manual Therapy  6. Therapeutic Exercise/Strengthening     GOALS: (Goals have been discussed and agreed upon with patient.)  Short-Term Functional Goals: Time Frame: 4 weeks  7. Independent in initial HEP  8. Increase ROM to 0-125  9. Ambulation without assistive device. 10. Pain below 2/10  Discharge Goals: Time Frame: 6 weeks  7. Independent in advanced HEP  8. ROM 0-130  9. Strength 5/5  10.  Minimal edema  11. Return to full ADL's  CLINICAL DECISION MAKING:   Outcome Measure: Tool Used: Knee injury and Osteoarthritis Outcome Score for Joint Replacement (KOOS, JR)  Score:  Initial: 14 (Interval: 52.465) 8/30/2021 Most Recent: TBD   Interpretation of Score: The KOOS, JR contains 7 items from the original KOOS survey. Items are coded from 0 to 4, none to extreme respectively. Dale Raphael is scored by summing the raw response (range 0-28) and then converting it to an interval score using the table provided below. The interval score ranges from 0 to 100 where 0 represents total knee disability and 100 represents perfect knee health. Medical Necessity:   · Patient demonstrates good rehab potential due to higher previous functional level. · Limited function following knee replacement  Reason for Services/Other Comments:  · Patient continues to require modification of therapeutic interventions to increase complexity of exercises. PT Patient Time In/Time Out  Time In: 1415  Time Out: 1500  Rehabilitation Potential For Stated Goals: Good  Regarding Katie Aquino II's therapy, I certify that the treatment plan above will be carried out by a therapist or under their direction. Thank you for this referral,  Gin Armstrong, PT                 The information in this section was collected on 8-30-21 (except where otherwise noted). HISTORY:   History of Present Injury/Illness (Reason for Referral):  Chronic knee pain due to degenerative arthritis.   Past Medical History/Comorbidities:   Mr. Mariann Mayo  has a past medical history of Allergic rhinitis, Cervicalgia (5/14/2013), Chronic LBP (9/25/2013), CKD (chronic kidney disease) stage 3, GFR 30-59 ml/min ([facility]) (04/12/2016), Combined hyperlipidemia, COVID-19 (01/03/2021), COVID-19 vaccine series completed (04/01/2021), DDD (degenerative disc disease), cervical, Dysmetabolic syndrome X (7/78/1862), GERD (gastroesophageal reflux disease), GERD (gastroesophageal reflux disease), Gout (5/14/2013), HTN (hypertension), Low HDL (under 40), Migraine, NAFLD (nonalcoholic fatty liver disease), Nausea & vomiting, Obesity (5/14/2013), PRATIBHA (obstructive sleep apnea), and Prediabetes (2012). Mr. Adriana Ramirez  has a past surgical history that includes hx cervical diskectomy (2004); hx lap cholecystectomy (2005); hx tonsillectomy (1951); hx cholecystectomy (2005); hx colonoscopy; hx endoscopy (2004); and hx lumbar laminectomy (1/2015). Social History/Living Environment:     denies barriers  Prior Level of Function/Work/Activity:  independent  Dominant Side:         RIGHT     Ambulatory/Rehab Services H2 Model Falls Risk Assessment    Risk Factors:       (1)  Gender [Male] Ability to Rise from Chair:       (1)  Pushes up, successful in one attempt    Falls Prevention Plan:       No modifications necessary   Total: (5 or greater = High Risk): 2    ©2010 [facility]. All Rights Reserved. [facility] Patent #[id number]. Federal Law prohibits the replication, distribution or use without written permission from [address]     Current Medications:       Current Outpatient Medications:     aspirin delayed-release 81 mg tablet, Take 1 Tablet by mouth every twelve (12) hours for 35 days. Indications: DVT prophylaxis, Disp: 70 Tablet, Rfl: 0    acetaminophen (Tylenol Extra Strength) 500 mg tablet, Take 1,000 mg by mouth every six (6) hours as needed for Pain., Disp: , Rfl:     metFORMIN (GLUCOPHAGE) 500 mg tablet, Take 500 mg by mouth two (2) times daily (with meals). Indications: type 2 diabetes mellitus, Disp: , Rfl:     fluticasone propionate (CUTIVATE) 0.05 % topical cream, Apply 1 g to affected area two (2) times a day. To face for dry skin patches on temples, Disp: , Rfl:     famotidine (PEPCID) 20 mg tablet, Take 20 mg by mouth two (2) times a day.  Indications: gastroesophageal reflux disease, Disp: , Rfl:     pantoprazole (PROTONIX) 20 mg tablet, Take 20 mg by mouth daily as needed (reflux). Indications: gastroesophageal reflux disease, Disp: , Rfl:     torsemide (DEMADEX) 10 mg tablet, 1 tab po qam prn edema (Patient taking differently: Take 10 mg by mouth daily.), Disp: 90 Tab, Rfl: 3    allopurinol (ZYLOPRIM) 300 mg tablet, 1 tab po qd (Patient taking differently: Take 300 mg by mouth daily. Indications: treatment to prevent acute gout attack), Disp: 90 Tab, Rfl: 1    gabapentin (NEURONTIN) 300 mg capsule, Take 1 Cap by mouth three (3) times daily. (Patient taking differently: Take 600 mg by mouth nightly. 2 capsules at bedtime), Disp: 270 Cap, Rfl: 1    losartan (COZAAR) 100 mg tablet, TAKE 1 TABLET BY MOUTH EVERY DAY (Patient taking differently: Take 50 mg by mouth daily. TAKE 1 TABLET BY MOUTH EVERY DAY  Indications: high blood pressure), Disp: 90 Tab, Rfl: 1    potassium chloride (KLOR-CON) 10 mEq tablet, Take 2 Tabs by mouth daily. (Patient taking differently: Take 20 mEq by mouth daily. Indications: prevention of low potassium in the blood), Disp: 180 Tab, Rfl: 1    atorvastatin (LIPITOR) 10 mg tablet, Take 1 Tab by mouth nightly. (Patient taking differently: Take 10 mg by mouth daily. Indications: excessive fat in the blood, high cholesterol and high triglycerides), Disp: 90 Tab, Rfl: 1    cpap machine kit, by Does Not Apply route. 13 cm with water, Disp: , Rfl:     OTHER, (Compounded Cream) Gabapentin 4%, Topiramate 2.5%, Lidocaine 2%, Prilocaine 2%,  Apply 1-2 pumps of pain cream to faocal pain area 2 times daily , Disp: , Rfl:     cetirizine (ZYRTEC) 10 mg tablet, Take 10 mg by mouth two (2) times a day.  Indications: inflammation of the nose due to an allergy, Disp: , Rfl:    Date Last Reviewed:  8/30/2021   Number of Personal Factors/Comorbidities that affect the Plan of Care: 0: LOW COMPLEXITY   EXAMINATION:   Observation/Orthostatic Postural Assessment:          Incision clean with steri-strips in place  Palpation:          Mild TTP peripatella  ROM:            Knee ROM  DATE  8-30-21 DATE     flexion R:110  L: R:  L:   extension R:3 lag  L: R:  L:       Strength:            Knee strength  DATE  8-30-21 DATE     flexion R:4+  L: R:  L:   extension R:4  L: R:  L:      Body Structures Involved:  1. Bones  2. Joints  3. Muscles Body Functions Affected:  1. Movement Related Activities and Participation Affected:  1. General Tasks and Demands   Number of elements (examined above) that affect the Plan of Care: 1-2: LOW COMPLEXITY   CLINICAL PRESENTATION:   Presentation: Stable and uncomplicated: LOW COMPLEXITY      Use of outcome tool(s) and clinical judgement create a POC that gives a: Clear prediction of patient's progress: LOW COMPLEXITY                    Treatment/Session Assessment:    · Response to Treatment:  Pt reports understanding of and agreement with treatment plan. · Compliance with Program/Exercises: Will assess as treatment progresses. · Recommendations/Intent for next treatment session: \"Next visit will focus on advancements to more challenging activities\". Future Appointments   Date Time Provider Jrodan Cardona   9/1/2021  1:00 PM [facility]   9/7/2021  2:30 PM [facility]   9/9/2021 10:30 AM Julio Clayton MD [facility]   9/9/2021  1:00 PM Dane Ganong, PT [facility]   9/13/2021  1:00 PM Dane Ganong, PT [facility]   9/15/2021  1:45 PM Dane Ganong, PT [facility]   9/20/2021  1:00 PM Dane Ganong, PT [facility]   9/22/2021  1:45 PM Dane Ganong, PT [facility]   9/27/2021  1:00 PM Dane Ganmaria elena, PT [facility]   9/29/2021  1:00 PM Dane Ganong, PT [facility]   5/18/2022  9:40 AM Katie Arechiga NP [facility]     Please explain any variance from Plan of Care.   Total Treatment Duration: 15 minute debra Mortensen, PT

## 2021-08-31 NOTE — PROGRESS NOTES
Rio Moctezuma II  : 1945  Primary: Altaf Horan Medicare Advantage  Secondary:  [address]  at [facility]  [address], [address], [place] [address]  [phone]   [phone]                                  OUTPATIENT PHYSICAL THERAPY: Daily Treatment Note 2021  Visit Count:  1  ICD-10: Treatment Diagnosis: Pain in joint,right knee (M25.561)  Stiffness in joint, right knee (M25.661)  Effusion of joint, right knee (M25.461)  Precautions/Allergies:   Codeine, Lisinopril, and Ultram [tramadol]   TREATMENT PLAN:  Effective Dates: 2021 TO 10/30/2021 (60 days). Frequency/Duration: 2 times a week for 60 Day(s)     MEDICAL/REFERRING DIAGNOSIS:  Presence of right artificial knee joint [Z96.651]   DATE OF ONSET: Surgery Date: 21  REFERRING PHYSICIAN: Jesús Pedro MD MD Orders: Ovidio Rosado and Treat  Return MD Appointment: 21     Pre-treatment Symptoms/Complaints:  Rio Moctezuma II reports mild pain in right knee following TKA 21. Moderate swelling and decreased ROM noted. Pain: Initial: Pain Intensity 1: 2  Pain Location 1: Knee  Pain Orientation 1: Right  Post Session:  2/10   Medications Last Reviewed:  2021  Updated Objective Findings:     TREATMENT:   THERAPEUTIC EXERCISE: (30 minutes):  Exercises per grid below to improve mobility, strength and coordination. Required minimal verbal cues to promote proper body mechanics. Progressed resistance, range and repetitions as indicated.      Date:  21 Date:   Date:     Activity/Exercise Parameters Parameters Parameters   Recumbent stepper Level 1 x 10'     QS X 15     SAQ X 15     SLR X 15     Heel slides X 15     Hip abd X 15     Hip add with ball squeeze X 15       Treatment/Session Summary:    · Response to Treatment: Rio Moctezuma II demonstrates good understanding of initial exercises. · Communication/Consultation:  ice as needed  · Equipment provided today:  None today  · Recommendations/Intent for next treatment session: Next visit will focus on progressive right knee stabilization. .    Total Treatment Billable Duration:  30 minutes therex  PT Patient Time In/Time Out  Time In: 1415  Time Out: 1500  Patricia Gibson PT    Future Appointments   Date Time Provider Jordan Cardona   9/1/2021  1:00 PM Jalen Hogan, PT [facility]   9/7/2021  2:30 PM Windramy Hogan, PT [facility]   9/9/2021 10:30 AM Elena Pop MD [facility] POAI POA   9/9/2021  1:00 PM Winda Dallas, PT [facility]   9/13/2021  1:00 PM Winda Edison, PT [facility]   9/15/2021  1:45 PM Winda Dallas, PT [facility]   9/20/2021  1:00 PM Winda Edison, PT [facility]   9/22/2021  1:45 PM Winda Dallas, PT [facility]   9/27/2021  1:00 PM Winda Edison, PT [facility]   9/29/2021  1:00 PM Winda Edisno, PT [facility]   5/18/2022  9:40 AM Speach, Severa Pedlar, GEMA [facility] PSCD PP

## 2021-09-01 ENCOUNTER — HOSPITAL ENCOUNTER (OUTPATIENT)
Dept: PHYSICAL THERAPY | Age: 76
Discharge: HOME | End: 2021-09-01
Payer: MEDICARE

## 2021-09-01 PROCEDURE — 97110 THERAPEUTIC EXERCISES: CPT

## 2021-09-01 PROCEDURE — 97140 MANUAL THERAPY 1/> REGIONS: CPT

## 2021-09-01 NOTE — PROGRESS NOTES
Chaim Fields [facility] Deepti   : 1945  Primary: Mushtaq Padilla Medicare Advantage  Secondary:  [address]  at [facility]  [address], [address], [place] [address]  [phone]   [phone]                                  OUTPATIENT PHYSICAL THERAPY: Daily Treatment Note 2021  Visit Count:  2  ICD-10: Treatment Diagnosis: Pain in joint,right knee (M25.561)  Stiffness in joint, right knee (M25.661)  Effusion of joint, right knee (M25.461)  Precautions/Allergies:   Codeine, Lisinopril, and Ultram [tramadol]   TREATMENT PLAN:  Effective Dates: 2021 TO 10/30/2021 (60 days). Frequency/Duration: 2 times a week for 60 Day(s)     MEDICAL/REFERRING DIAGNOSIS:  Presence of right artificial knee joint [Z96.651]   DATE OF ONSET: Surgery Date: 21  REFERRING PHYSICIAN: Julio Clayton MD MD Orders: Criselda Vilchis and Treat  Return MD Appointment: 21     Pre-treatment Symptoms/Complaints:  Otto Bobo II reports only mild pain after last session. Pain: Initial: Pain Intensity 1: 2  Pain Location 1: Knee  Pain Orientation 1: Right  Post Session:  2/10   Medications Last Reviewed:  2021  Updated Objective Findings:     TREATMENT:   THERAPEUTIC EXERCISE: (30 minutes):  Exercises per grid below to improve mobility, strength and coordination. Required minimal verbal cues to promote proper body mechanics. Progressed resistance, range and repetitions as indicated.      Date:  21 Date:  21 Date:     Activity/Exercise Parameters Parameters Parameters   Recumbent stepper Level 1 x 10' Level 2 x 10'    Lunge step on box  X 10 RLE    Ant step ups  X 10 BLE    Lat step ups  X 10 BLE    Ant heel tap  X 10 BLE    QS X 15 X 15    SAQ X 15 X 10 1.5#    SLR X 15 X 10 1.5#    Heel slides X 15 X 15    Hip abd X 15 X 15    Hip add with ball squeeze X 15 X 15      MANUAL THERAPY: (15 minutes): Joint mobilization and Soft tissue mobilization was utilized and necessary because of the patient's restricted joint motion. Patellar mobs  PROM with overpressure    Treatment/Session Summary:    · Response to Treatment: Kristen Ervin II completes additional activity with minimal difficulty  · Communication/Consultation:  ice as needed  · Equipment provided today:  None today  · Recommendations/Intent for next treatment session: Next visit will focus on progressive right knee stabilization. .    Total Treatment Billable Duration:  30 minutes therex 15 min manual  PT Patient Time In/Time Out  Time In: 1300  Time Out: 7852  Filemon Sequeira PT    Future Appointments   Date Time Provider Jordan Cardona   9/7/2021  2:30 PM Stacia Pro, PT [facility]   9/9/2021 10:30 AM Unique Leo MD [facility] POAI POA   9/9/2021  1:00 PM Jeisamaretta Inocencia, PT [facility]   9/13/2021  1:00 PM Stacia Sandovala, PT [facility]   9/15/2021  1:45 PM Stacia Sandovala, PT [facility]   9/20/2021  1:00 PM Jeannetta Inocencia, PT [facility]   9/22/2021  1:45 PM Stacia Sandovala, PT [facility]   9/27/2021  1:00 PM Jeisamaretta Inocencia, PT [facility]   9/29/2021  1:00 PM Stacia Sandovala, PT [facility]   5/18/2022  9:40 AM Carmen Arechiga NP [facility] PS[facility] PP

## 2021-09-07 ENCOUNTER — HOSPITAL ENCOUNTER (OUTPATIENT)
Dept: PHYSICAL THERAPY | Age: 76
Discharge: HOME | End: 2021-09-07
Payer: MEDICARE

## 2021-09-07 PROCEDURE — 97110 THERAPEUTIC EXERCISES: CPT

## 2021-09-07 PROCEDURE — 97140 MANUAL THERAPY 1/> REGIONS: CPT

## 2021-09-09 ENCOUNTER — HOSPITAL ENCOUNTER (OUTPATIENT)
Dept: PHYSICAL THERAPY | Age: 76
Discharge: HOME | End: 2021-09-09
Payer: MEDICARE

## 2021-09-09 DIAGNOSIS — Z96.651 HISTORY OF TOTAL KNEE ARTHROPLASTY, RIGHT: ICD-10-CM

## 2021-09-09 PROCEDURE — 97140 MANUAL THERAPY 1/> REGIONS: CPT

## 2021-09-09 PROCEDURE — 97110 THERAPEUTIC EXERCISES: CPT

## 2021-09-13 ENCOUNTER — HOSPITAL ENCOUNTER (OUTPATIENT)
Dept: PHYSICAL THERAPY | Age: 76
Discharge: HOME | End: 2021-09-13
Payer: MEDICARE

## 2021-09-13 NOTE — PROGRESS NOTES
Kiah Wayne   (DSI:[id number]) [address]  at   [facility]  [address], [address], [place] [address]  [phone]   [phone]           Patient cancelled appointment today due to stomach bug, and is rescheduled for later date.     Jaime Alatorre, PT.

## 2021-09-14 ENCOUNTER — HOSPITAL ENCOUNTER (OUTPATIENT)
Dept: PHYSICAL THERAPY | Age: 76
Discharge: HOME | End: 2021-09-14
Payer: MEDICARE

## 2021-09-14 NOTE — PROGRESS NOTES
Erasto Wang   (HIJ:[id number]/[id number]) [address]  at   [address]  [address], [address], [place] [address]  [phone]   [phone]           Patient again cancelled appointment today due to illness, and is rescheduled for later date.     Jaime Alatorre, PT.

## 2021-09-15 ENCOUNTER — HOSPITAL ENCOUNTER (OUTPATIENT)
Dept: PHYSICAL THERAPY | Age: 76
Discharge: HOME | End: 2021-09-15
Payer: MEDICARE

## 2021-09-15 PROCEDURE — 97140 MANUAL THERAPY 1/> REGIONS: CPT

## 2021-09-15 PROCEDURE — 97110 THERAPEUTIC EXERCISES: CPT

## 2021-09-20 ENCOUNTER — HOSPITAL ENCOUNTER (OUTPATIENT)
Dept: PHYSICAL THERAPY | Age: 76
Discharge: HOME | End: 2021-09-20
Payer: MEDICARE

## 2021-09-20 NOTE — PROGRESS NOTES
Darryl Acosta   (JZO:6/[id number]) [address]  at   [facility]  [address], [address], [place] [address]  [phone]   [phone]           Patient cancelled appointment today, and is rescheduled for later date.     Jaime Alatorre PT.

## 2021-09-22 ENCOUNTER — HOSPITAL ENCOUNTER (OUTPATIENT)
Dept: PHYSICAL THERAPY | Age: 76
Discharge: HOME | End: 2021-09-22
Payer: MEDICARE

## 2021-09-22 NOTE — PROGRESS NOTES
Bonnie Barreto   (HGE:[id number]) [address]  at   [facility]  [address], [address], [place] [address]  [phone]   [phone]           Patient cancelled appointment today due to illness, and is rescheduled for later date.     Jaime Alatorre, PT.

## 2021-09-27 ENCOUNTER — HOSPITAL ENCOUNTER (OUTPATIENT)
Dept: PHYSICAL THERAPY | Age: 76
Discharge: HOME | End: 2021-09-27
Payer: MEDICARE

## 2021-09-27 PROCEDURE — 97140 MANUAL THERAPY 1/> REGIONS: CPT

## 2021-09-27 PROCEDURE — 97110 THERAPEUTIC EXERCISES: CPT

## 2021-09-29 ENCOUNTER — HOSPITAL ENCOUNTER (OUTPATIENT)
Dept: PHYSICAL THERAPY | Age: 76
Discharge: HOME | End: 2021-09-29
Payer: MEDICARE

## 2021-09-29 PROCEDURE — 97110 THERAPEUTIC EXERCISES: CPT

## 2021-09-29 PROCEDURE — 97140 MANUAL THERAPY 1/> REGIONS: CPT

## 2021-10-04 ENCOUNTER — HOSPITAL ENCOUNTER (OUTPATIENT)
Dept: PHYSICAL THERAPY | Age: 76
Discharge: HOME | End: 2021-10-04
Payer: MEDICARE

## 2021-10-04 PROCEDURE — 97110 THERAPEUTIC EXERCISES: CPT

## 2021-10-04 PROCEDURE — 97016 VASOPNEUMATIC DEVICE THERAPY: CPT

## 2021-10-04 NOTE — PROGRESS NOTES
Capri Watson [facility]  : 1945  Primary: Ary Shaver Medicare Advantage  Secondary:  [address]  at [facility]  [address], [address], [place] [address]  [phone]   [phone]                                  OUTPATIENT PHYSICAL THERAPY: Daily Treatment Note 2021  Visit Count:  3  ICD-10: Treatment Diagnosis: Pain in joint,right knee (M25.561)  Stiffness in joint, right knee (M25.661)  Effusion of joint, right knee (M25.461)  Precautions/Allergies:   Codeine, Lisinopril, and Ultram [tramadol]   TREATMENT PLAN:  Effective Dates: 2021 TO 10/30/2021 (60 days). Frequency/Duration: 2 times a week for 60 Day(s)     MEDICAL/REFERRING DIAGNOSIS:  Presence of right artificial knee joint [Z96.651]   DATE OF ONSET: Surgery Date: 21  REFERRING PHYSICIAN: Samm Dial MD MD Orders: Fabiana Caceres and Treat  Return MD Appointment: 21     Pre-treatment Symptoms/Complaints:  Alberta Hackett II reports his knee is feeling pretty good. Pain: Initial: Pain Intensity 1: 2  Pain Location 1: Knee  Pain Orientation 1: Right  Post Session:  2/10   Medications Last Reviewed:  2021  Updated Objective Findings:     TREATMENT:   THERAPEUTIC EXERCISE: (30 minutes):  Exercises per grid below to improve mobility, strength and coordination. Required minimal verbal cues to promote proper body mechanics. Progressed resistance, range and repetitions as indicated.      Date:  21 Date:  21 Date:  21   Activity/Exercise Parameters Parameters Parameters   Recumbent stepper Level 1 x 10' Level 2 x 10' Level 2 x 10'   Lunge step on box  X 10 RLE X 10 RLE   Ant step ups  X 10 BLE X 10 BLE   Lat step ups  X 10 BLE X 10 BLE   Ant heel tap  X 10 BLE X 10 BLE   QS X 15 X 15 X 15   SAQ X 15 X 10 1.5# X 10 1.5#   SLR X 15 X 10 1.5# X 10 1.5#   Heel slides X 15 X 15    Hip abd X 15 X 15    Hip add with ball squeeze X 15 X 15    Standing ab/add   X 10 1.5#   Standing ham curls   X 10 1.5#   Standing marching   X 10 1.5#   Stand heel/toe   X 10 1.5#     MANUAL THERAPY: (15 minutes): Joint mobilization and Soft tissue mobilization was utilized and necessary because of the patient's restricted joint motion. Patellar mobs  PROM with overpressure    Treatment/Session Summary:    · Response to Treatment: Zorita Dame II allows fait PROM. · Communication/Consultation:  ice as needed  · Equipment provided today:  None today  · Recommendations/Intent for next treatment session: Next visit will focus on progressive right knee stabilization. .    Total Treatment Billable Duration:  30 minutes therex 15 min manual  PT Patient Time In/Time Out  Time In: 1430  Time Out: 1515  Gin Armstrong PT    Future Appointments   Date Time Provider Jordan Cardona   10/4/2021  1:00 PM Ruby Glaser PT [facility]   10/6/2021  1:00 PM Ruby Glaser PT [facility]   10/11/2021  1:45 PM Scot Maharaj PT [facility]   10/14/2021  1:45 PM Scot Maharaj PT [facility]   2/10/2022 10:30 AM MD RICHARD Mohamud POAI POA   5/18/2022  9:40 AM Tahira Arechiga NP [facility] PP

## 2021-10-04 NOTE — PROGRESS NOTES
Peyton Yates II  : 1945  Primary: Vineet Horan Medicare Advantage  Secondary:  [address]  at [facility]  [address], [place] [address]  [phone]   [phone]                                  OUTPATIENT PHYSICAL THERAPY: Daily Treatment Note 2021  Visit Count:  7  ICD-10: Treatment Diagnosis: Pain in joint,right knee (M25.561)  Stiffness in joint, right knee (M25.661)  Effusion of joint, right knee (M25.461)  Precautions/Allergies:   Codeine, Lisinopril, and Ultram [tramadol]   TREATMENT PLAN:  Effective Dates: 2021 TO 10/30/2021 (60 days). Frequency/Duration: 2 times a week for 60 Day(s)     MEDICAL/REFERRING DIAGNOSIS:  Presence of right artificial knee joint [Z96.651]   DATE OF ONSET: Surgery Date: 21  REFERRING PHYSICIAN: Marciano Caldwell MD MD Orders: Eulene Medal and Treat  Return MD Appointment: 21     Pre-treatment Symptoms/Complaints:  Peyton Yates II states knee is a little stiff but pain is improving. Pain: Initial: Pain Intensity 1: 2  Pain Location 1: Knee  Pain Orientation 1: Right  Post Session: 1/10   Medications Last Reviewed:  2021  Updated Objective Findings:    ROM:            Knee ROM  DATE  21 DATE  21  DATE  21    flexion R:110  L: R:120  L:  A: 125   P:130   extension R:3 lag  L: R:3 lag  L:  A:-3   P:0         TREATMENT:   THERAPEUTIC EXERCISE: (30 minutes):  Exercises per grid below to improve mobility, strength and coordination. Required minimal verbal cues to promote proper body mechanics. Progressed resistance, range and repetitions as indicated.      Date:  21 Date:  21 Date:  21 Date:  21 Date:  9-15-21 Date:  21 Date:  21   Activity/Exercise Parameters Parameters Parameters       Recumbent stepper Level 1 x 10' Level 2 x 10' Level 2 x 10' Level 2 x 10' Level 2 x 10' Level 2 x 10' Level 2 x 10'   Lunge step on box  X 10 RLE X 10 RLE X 10 RLE X 10 RLE X 10 RLE X 10 RLE   Ant step ups  X 10 BLE X 10 BLE X 10 BLE X 10 BLE X 10 BLE X 10 BLE   Lat step ups  X 10 BLE X 10 BLE X 10 BLE X 10 BLE X 10 BLE X 10 BLE   Ant heel tap  X 10 BLE X 10 BLE X 10 BLE X 10 BLE X 10 BLE X 10 BLE   QS X 15 X 15 X 15 X 15 X 15 X 15 X 15   SAQ X 15 X 10 1.5# X 10 1.5# X 10 2# X 10 2.5# X 10 2.5# X 10 3#   SLR X 15 X 10 1.5# X 10 1.5# X 10 2# X 10 2.5# X 10 2.5# X 10 3#   Heel slides X 15 X 15        Hip abd X 15 X 15        Hip add with ball squeeze X 15 X 15        Standing ab/add   X 10 1.5# X 10 2# X 10 2.5# X 10 2.5# X 10 3#   Standing ham curls   X 10 1.5# X 10 2# X 10 2.5# X 10 2.5# X 10 3#   Standing marching   X 10 1.5# X 10 2# X 10 2.5# X 10 2.5# X 10 3#   Stand heel/toe   X 10 1.5# X 10 2# X 10 2.5# X 10 2.5# X 10 3#   Side steps      OTB 45'x 4 OTB 45'x 4     MANUAL THERAPY: (15 minutes): Joint mobilization and Soft tissue mobilization was utilized and necessary because of the patient's restricted joint motion. Patellar mobs  PROM with overpressure  Scar massage    Treatment/Session Summary:    · Response to Treatment: Roderick Stair II demonstrates minimal antalgic gait  · Communication/Consultation:  ice as needed  · Equipment provided today:  None today  · Recommendations/Intent for next treatment session: Next visit will focus on progressive right knee stabilization. .    Total Treatment Billable Duration:  30 minutes therex 15 min manual  PT Patient Time In/Time Out  Time In: 1300  Time Out: 8695  Haylie Botello PT    Future Appointments   Date Time Provider Jordan Cardona   10/4/2021  1:00 PM Thomas Valiente PT [facility]   10/6/2021  1:00 PM Thomas Valiente, PT [facility]   10/11/2021  1:45 PM Zohra Lisa, PT [facility]   10/14/2021  1:45 PM Zohra Lisa, PT [facility]   2/10/2022 10:30 AM MD RICHARD Romero 5/18/2022  9:40 AM Lisset Arechiga, GEMA [facility] PSCD PP

## 2021-10-04 NOTE — PROGRESS NOTES
Macy Felix II  : 1945  Primary: Ck Horan Medicare Advantage  Secondary:  [address]  at [facility]  [address], [address], [place] [address]  [phone]   [phone]                                  OUTPATIENT PHYSICAL THERAPY: Daily Treatment Note 2021  Visit Count:  6  ICD-10: Treatment Diagnosis: Pain in joint,right knee (M25.561)  Stiffness in joint, right knee (M25.661)  Effusion of joint, right knee (M25.461)  Precautions/Allergies:   Codeine, Lisinopril, and Ultram [tramadol]   TREATMENT PLAN:  Effective Dates: 2021 TO 10/30/2021 (60 days). Frequency/Duration: 2 times a week for 60 Day(s)     MEDICAL/REFERRING DIAGNOSIS:  Presence of right artificial knee joint [Z96.651]   DATE OF ONSET: Surgery Date: 21  REFERRING PHYSICIAN: Avelino Bazan MD MD Orders: Haylee Hinson and Treat  Return MD Appointment: 21     Pre-treatment Symptoms/Complaints:  Macy Felix II reports he he is very concerned about losing ROM missing last week. Pain: Initial: Pain Intensity 1: 2  Pain Location 1: Knee  Pain Orientation 1: Right  Post Session: 1/10   Medications Last Reviewed:  2021  Updated Objective Findings:    ROM:            Knee ROM  DATE  21 DATE  21  DATE  21    flexion R:110  L: R:120  L:  A: 125   P:130   extension R:3 lag  L: R:3 lag  L:  A:-3   P:0         TREATMENT:   THERAPEUTIC EXERCISE: (30 minutes):  Exercises per grid below to improve mobility, strength and coordination. Required minimal verbal cues to promote proper body mechanics. Progressed resistance, range and repetitions as indicated.      Date:  21 Date:  21 Date:  21 Date:  21 Date:  9-15-21 Date:  21   Activity/Exercise Parameters Parameters Parameters      Recumbent stepper Level 1 x 10' Level 2 x 10' Level 2 x 10' Level 2 x 10' Level 2 x 10' Level 2 x 10'   Lunge step on box  X 10 RLE X 10 RLE X 10 RLE X 10 RLE X 10 RLE   Ant step ups  X 10 BLE X 10 BLE X 10 BLE X 10 BLE X 10 BLE   Lat step ups  X 10 BLE X 10 BLE X 10 BLE X 10 BLE X 10 BLE   Ant heel tap  X 10 BLE X 10 BLE X 10 BLE X 10 BLE X 10 BLE   QS X 15 X 15 X 15 X 15 X 15 X 15   SAQ X 15 X 10 1.5# X 10 1.5# X 10 2# X 10 2.5# X 10 2.5#   SLR X 15 X 10 1.5# X 10 1.5# X 10 2# X 10 2.5# X 10 2.5#   Heel slides X 15 X 15       Hip abd X 15 X 15       Hip add with ball squeeze X 15 X 15       Standing ab/add   X 10 1.5# X 10 2# X 10 2.5# X 10 2.5#   Standing ham curls   X 10 1.5# X 10 2# X 10 2.5# X 10 2.5#   Standing marching   X 10 1.5# X 10 2# X 10 2.5# X 10 2.5#   Stand heel/toe   X 10 1.5# X 10 2# X 10 2.5# X 10 2.5#   Side steps      OTB 45'x 4     MANUAL THERAPY: (15 minutes): Joint mobilization and Soft tissue mobilization was utilized and necessary because of the patient's restricted joint motion. Patellar mobs  PROM with overpressure  Scar massage    Treatment/Session Summary:    · Response to Treatment: Bill Earle II demonstrates good increase in knee flexion even with missed days. · Communication/Consultation:  ice as needed  · Equipment provided today:  None today  · Recommendations/Intent for next treatment session: Next visit will focus on progressive right knee stabilization. .    Total Treatment Billable Duration:  30 minutes therex 15 min manual  PT Patient Time In/Time Out  Time In: 1300  Time Out: 1345  Jenn Garcia PT    Future Appointments   Date Time Provider Joradn Cardona   10/4/2021  1:00 PM Yesenia Cox, PT [facility]   10/6/2021  1:00 PM Yesenia Cox PT [facility]   10/11/2021  1:45 PM Silvino Restrepo PT [facility]   10/14/2021  1:45 PM Silvino Restrepo, KIRSTIN [facility]   2/10/2022 10:30 AM MD RICHARD GuptaA   5/18/2022  9:40 AM Graham Arechiga NP [facility] PS[facility] PP

## 2021-10-04 NOTE — PROGRESS NOTES
Noemy Correa [facility]  : 1945  Primary: Luis Lennon Medicare Advantage  Secondary:  [address]  at [facility]  [address], [place] [address]  [phone]   [phone]                                  OUTPATIENT PHYSICAL THERAPY: Daily Treatment Note 9/15/2021  Visit Count:  5  ICD-10: Treatment Diagnosis: Pain in joint,right knee (M25.561)  Stiffness in joint, right knee (M25.661)  Effusion of joint, right knee (M25.461)  Precautions/Allergies:   Codeine, Lisinopril, and Ultram [tramadol]   TREATMENT PLAN:  Effective Dates: 2021 TO 10/30/2021 (60 days). Frequency/Duration: 2 times a week for 60 Day(s)     MEDICAL/REFERRING DIAGNOSIS:  Presence of right artificial knee joint [Z96.651]   DATE OF ONSET: Surgery Date: 21  REFERRING PHYSICIAN: Unique Leo MD MD Orders: Osvaldo Hubbard and Treat  Return MD Appointment: 21     Pre-treatment Symptoms/Complaints:  Kristen Ervin II reports he had a stomach virus    Pain: Initial: Pain Intensity 1: 1  Pain Location 1: Knee  Pain Orientation 1: Right  Post Session: 1/10   Medications Last Reviewed:  9/15/2021  Updated Objective Findings:    ROM:            Knee ROM  DATE  21 DATE  21    flexion R:110  L: R:120  L:   extension R:3 lag  L: R:3 lag  L:         TREATMENT:   THERAPEUTIC EXERCISE: (30 minutes):  Exercises per grid below to improve mobility, strength and coordination. Required minimal verbal cues to promote proper body mechanics. Progressed resistance, range and repetitions as indicated.      Date:  21 Date:  21 Date:  21 Date:  21 Date:  9-15-21   Activity/Exercise Parameters Parameters Parameters     Recumbent stepper Level 1 x 10' Level 2 x 10' Level 2 x 10' Level 2 x 10' Level 2 x 10'   Lunge step on box  X 10 RLE X 10 RLE X 10 RLE X 10 RLE   Ant step ups  X 10 BLE X 10 BLE X 10 BLE X 10 BLE Lat step ups  X 10 BLE X 10 BLE X 10 BLE X 10 BLE   Ant heel tap  X 10 BLE X 10 BLE X 10 BLE X 10 BLE   QS X 15 X 15 X 15 X 15 X 15   SAQ X 15 X 10 1.5# X 10 1.5# X 10 2# X 10 2.5#   SLR X 15 X 10 1.5# X 10 1.5# X 10 2# X 10 2.5#   Heel slides X 15 X 15      Hip abd X 15 X 15      Hip add with ball squeeze X 15 X 15      Standing ab/add   X 10 1.5# X 10 2# X 10 2.5#   Standing ham curls   X 10 1.5# X 10 2# X 10 2.5#   Standing marching   X 10 1.5# X 10 2# X 10 2.5#   Stand heel/toe   X 10 1.5# X 10 2# X 10 2.5#     MANUAL THERAPY: (15 minutes): Joint mobilization and Soft tissue mobilization was utilized and necessary because of the patient's restricted joint motion. Patellar mobs  PROM with overpressure  Scar massage    Treatment/Session Summary:    · Response to Treatment: Dariana Briseyda II demonstrates no loss of knee flexion with missed days. · Communication/Consultation:  ice as needed  · Equipment provided today:  None today  · Recommendations/Intent for next treatment session: Next visit will focus on progressive right knee stabilization. .    Total Treatment Billable Duration:  30 minutes therex 15 min manual  PT Patient Time In/Time Out  Time In: 1345  Time Out: 1430  Logan Bailey PT    Future Appointments   Date Time Provider Jordan Cardona   10/4/2021  1:00 PM Antony Linda, PT [facility]   10/6/2021  1:00 PM [facility] Linda, [facility]   10/11/2021  1:45 PM Cesario Reyes PT [facility]   10/14/2021  1:45 PM [facility]   2/10/2022 10:30 AM MD RICHARD Altamirano POAI POA   5/18/2022  9:40 AM Tanesha Arechiga, GEMA RAMOS PSCD PP

## 2021-10-04 NOTE — PROGRESS NOTES
Gonsalo Juárez [facility] Deepti   : 1945  Primary: Jesusita Or Medicare Advantage  Secondary:  [address]  at [facility]  [address], [address], [place]  [phone]   [phone]                                  OUTPATIENT PHYSICAL THERAPY: Daily Treatment Note 2021  Visit Count:  4  ICD-10: Treatment Diagnosis: Pain in joint,right knee (M25.561)  Stiffness in joint, right knee (M25.661)  Effusion of joint, right knee (M25.461)  Precautions/Allergies:   Codeine, Lisinopril, and Ultram [tramadol]   TREATMENT PLAN:  Effective Dates: 2021 TO 10/30/2021 (60 days). Frequency/Duration: 2 times a week for 60 Day(s)     MEDICAL/REFERRING DIAGNOSIS:  Presence of right artificial knee joint [Z96.651]   DATE OF ONSET: Surgery Date: 21  REFERRING PHYSICIAN: Charlotte Rios MD MD Orders: Nolberto Rey and Treat  Return MD Appointment: 21     Pre-treatment Symptoms/Complaints:  Francisco Javier Lundberg II reports he returns to Dr. Mary Manning today    Pain: Initial: Pain Intensity 1: 2  Pain Location 1: Knee  Pain Orientation 1: Right  Post Session:  2/10   Medications Last Reviewed:  2021  Updated Objective Findings:    ROM:            Knee ROM  DATE  21 DATE  21    flexion R:110  L: R:120  L:   extension R:3 lag  L: R:3 lag  L:         TREATMENT:   THERAPEUTIC EXERCISE: (30 minutes):  Exercises per grid below to improve mobility, strength and coordination. Required minimal verbal cues to promote proper body mechanics. Progressed resistance, range and repetitions as indicated.      Date:  21 Date:  21 Date:  21 Date:  21   Activity/Exercise Parameters Parameters Parameters    Recumbent stepper Level 1 x 10' Level 2 x 10' Level 2 x 10' Level 2 x 10'   Lunge step on box  X 10 RLE X 10 RLE X 10 RLE   Ant step ups  X 10 BLE X 10 BLE X 10 BLE   Lat step ups  X 10 BLE X 10 BLE X 10 BLE   Ant heel tap  X 10 BLE X 10 BLE X 10 BLE   QS X 15 X 15 X 15 X 15   SAQ X 15 X 10 1.5# X 10 1.5# X 10 2#   SLR X 15 X 10 1.5# X 10 1.5# X 10 2#   Heel slides X 15 X 15     Hip abd X 15 X 15     Hip add with ball squeeze X 15 X 15     Standing ab/add   X 10 1.5# X 10 2#   Standing ham curls   X 10 1.5# X 10 2#   Standing marching   X 10 1.5# X 10 2#   Stand heel/toe   X 10 1.5# X 10 2#     MANUAL THERAPY: (15 minutes): Joint mobilization and Soft tissue mobilization was utilized and necessary because of the patient's restricted joint motion. Patellar mobs  PROM with overpressure    Treatment/Session Summary:    · Response to Treatment: Maria R Kevin II demonstrating improved knee flexion. · Communication/Consultation:  ice as needed  · Equipment provided today:  None today  · Recommendations/Intent for next treatment session: Next visit will focus on progressive right knee stabilization. .    Total Treatment Billable Duration:  30 minutes therex 15 min manual  PT Patient Time In/Time Out  Time In: 1300  Time Out: 1345  Nina Nuno PT    Future Appointments   Date Time Provider Jordan Cardona   10/4/2021  1:00 PM Nicki Turner PT [facility]   10/6/2021  1:00 PM KIRSTIN Vazquez [facility]   10/11/2021  1:45 PM KIRSTIN Hernadez [facility]   10/14/2021  1:45 PM KIRSTIN Hernadez [facility]   2/10/2022 10:30 AM Tanisha Jones MD [facility] POAI POA   5/18/2022  9:40 AM Chavo Arechiga NP [facility] PP

## 2021-10-11 ENCOUNTER — HOSPITAL ENCOUNTER (OUTPATIENT)
Dept: PHYSICAL THERAPY | Age: 76
Discharge: HOME | End: 2021-10-11
Payer: MEDICARE

## 2021-10-11 PROCEDURE — 97110 THERAPEUTIC EXERCISES: CPT

## 2021-10-11 NOTE — PROGRESS NOTES
Gwinda Hu [facility] Deepti   : 1945  Primary: Sincere Horan Medicare Advantage  Secondary:  [address]  at [facility]  [address], [address], [place] [address]  [phone]   [phone]                                  OUTPATIENT PHYSICAL THERAPY: Daily Treatment Note 10/11/2021  Visit Count:  9  ICD-10: Treatment Diagnosis: Pain in joint,right knee (M25.561)  Stiffness in joint, right knee (M25.661)  Effusion of joint, right knee (M25.461)  Precautions/Allergies:   Codeine, Lisinopril, and Ultram [tramadol]   TREATMENT PLAN:  Effective Dates: 2021 TO 10/30/2021 (60 days). Frequency/Duration: 2 times a week for 60 Day(s)     MEDICAL/REFERRING DIAGNOSIS:  Presence of right artificial knee joint [Z96.651]   DATE OF ONSET: Surgery Date: 21  REFERRING PHYSICIAN: Jessica Cope MD MD Orders: Mickey Marsh and Treat  Return MD Appointment: 21     Pre-treatment Symptoms/Complaints:  Bill Og II reports gradual but steady improvement. Pain: Initial: Pain Intensity 1: 2  Pain Location 1: Knee  Pain Orientation 1: Right  Post Session: 1/10   Medications Last Reviewed:  10/11/2021  Updated Objective Findings:    ROM:            Knee ROM  DATE  21 DATE  21  DATE  21    flexion R:110  L: R:120  L:  A: 125   P:130   extension R:3 lag  L: R:3 lag  L:  A:-3   P:0         TREATMENT:   THERAPEUTIC EXERCISE: (40 minutes):  Exercises per grid below to improve mobility, strength and coordination. Required minimal verbal cues to promote proper body mechanics. Progressed resistance, range and repetitions as indicated.      Date:  21 Date:  21 Date:  21 Date:  21 Date:  9-15-21 Date:  21 Date:  21 Date:  10/4/21 Date:  10/11/21   Activity/Exercise Parameters Parameters Parameters     Parameters Parameters   Recumbent stepper Level 1 x 10' Level 2 x 10' Level 2 x 10' Level 2 x 10' Level 2 x 10' Level 2 x 10' Level 2 x 10' L3, 10 mins L3, 10 mins   Lunge step on box  X 10 RLE X 10 RLE X 10 RLE X 10 RLE X 10 RLE X 10 RLE  X 10 RLE   Ant step ups  X 10 BLE X 10 BLE X 10 BLE X 10 BLE X 10 BLE X 10 BLE x10 BLE X 10 BLE   Lat step ups  X 10 BLE X 10 BLE X 10 BLE X 10 BLE X 10 BLE X 10 BLE x10 BLE  X 10 BLE   Ant heel tap  X 10 BLE X 10 BLE X 10 BLE X 10 BLE X 10 BLE X 10 BLE x10 BLE X 10 BLE   QS X 15 X 15 X 15 X 15 X 15 X 15 X 15 20x B X 15   SAQ X 15 X 10 1.5# X 10 1.5# X 10 2# X 10 2.5# X 10 2.5# X 10 3# 20x 4# B 20x 4#   SLR X 15 X 10 1.5# X 10 1.5# X 10 2# X 10 2.5# X 10 2.5# X 10 3#  20x 4#   Heel slides X 15 X 15          Hip abd X 15 X 15          Hip add with ball squeeze X 15 X 15          Standing ab/add   X 10 1.5# X 10 2# X 10 2.5# X 10 2.5# X 10 3# x15 B 4# 15 x 4#   Standing ham curls   X 10 1.5# X 10 2# X 10 2.5# X 10 2.5# X 10 3# 20x B 4#  15 x 4#   Standing marching   X 10 1.5# X 10 2# X 10 2.5# X 10 2.5# X 10 3# 15x B 4# 15 x 4#   Stand heel/toe   X 10 1.5# X 10 2# X 10 2.5# X 10 2.5# X 10 3# 20x B 4#  15 x 4#   Side steps      OTB 45'x 4 OTB 45'x 4  OTB 45'x 4   Lumbar extension        10x standing  5x prone    Rolling stool pull alongs         45' x 4     MANUAL THERAPY: (0 minutes): Joint mobilization and Soft tissue mobilization was utilized and necessary because of the patient's restricted joint motion. Patellar mobs  PROM with overpressure  Scar massage    MODALITIES (0 mins) - vaso to R knee in recliner, 34*, medium compression to decrease pain/swelling    Treatment/Session Summary:    · Response to Treatment: Nano Santana II had no complaints with session today. · Communication/Consultation:  ice as needed  · Equipment provided today:  None today  · Recommendations/Intent for next treatment session: Next visit will focus on progressive right knee stabilization. .    Total Treatment Billable Duration:  40 minutes therex   PT Patient Time In/Time Out  Time In: 1345  Time Out: 1430  Wilver Connell PT    Future Appointments   Date Time Provider Jordan Dulce   10/14/2021  1:45 PM Christy Fournier PT [facility]   2/10/2022 10:30 AM MD RICHARD Syed POAI POA   5/18/2022  9:40 AM Demarco Arechiga NP [facility] PSCD PP

## 2021-10-14 ENCOUNTER — HOSPITAL ENCOUNTER (OUTPATIENT)
Dept: PHYSICAL THERAPY | Age: 76
Discharge: HOME | End: 2021-10-14
Payer: MEDICARE

## 2021-10-14 PROCEDURE — 97110 THERAPEUTIC EXERCISES: CPT

## 2021-10-14 NOTE — PROGRESS NOTES
Selestino Dancer [facility]  : 1945  Primary: Beena العلي Medicare Advantage  Secondary:  [address]  at [facility]  [address], [address], [place] [address]  [phone]   [phone]                                  OUTPATIENT PHYSICAL THERAPY: Daily Treatment Note 10/14/2021  Visit Count:  10  ICD-10: Treatment Diagnosis: Pain in joint,right knee (M25.561)  Stiffness in joint, right knee (M25.661)  Effusion of joint, right knee (M25.461)  Precautions/Allergies:   Codeine, Lisinopril, and Ultram [tramadol]   TREATMENT PLAN:  Effective Dates: 2021 TO 10/30/2021 (60 days). Frequency/Duration: 2 times a week for 60 Day(s)     MEDICAL/REFERRING DIAGNOSIS:  Presence of right artificial knee joint [Z96.651]   DATE OF ONSET: Surgery Date: 21  REFERRING PHYSICIAN: Hay Garcia MD MD Orders: Mya Morrow and Treat  Return MD Appointment: 21     Pre-treatment Symptoms/Complaints:  Luis Raya II reports working his knee hard with HEP, and mild soreness distal knee. Pain: Initial: Pain Intensity 1: 3  Pain Location 1: Knee  Pain Orientation 1: Right  Post Session: 1/10   Medications Last Reviewed:  10/14/2021  Updated Objective Findings:    ROM:            Knee ROM  DATE  21 DATE  21  DATE  21  DATE  10-14-21   flexion R:110  L: R:120  L:  A: 125   P:130 A: 130   extension R:3 lag  L: R:3 lag  L:  A:-3   P:0 A:2  P:0         TREATMENT:   THERAPEUTIC EXERCISE: (40 minutes):  Exercises per grid below to improve mobility, strength and coordination. Required minimal verbal cues to promote proper body mechanics. Progressed resistance, range and repetitions as indicated.      Date:  21 Date:  21 Date:  21 Date:  9-15-21 Date:  21 Date:  21 Date:  10/4/21 Date:  10/11/21 Date:  10/14/21   Activity/Exercise Parameters Parameters     Parameters Parameters Parameters   Recumbent stepper Level 2 x 10' Level 2 x 10' Level 2 x 10' Level 2 x 10' Level 2 x 10' Level 2 x 10' L3, 10 mins L3, 10 mins L3, 10 mins   Lunge step on box X 10 RLE X 10 RLE X 10 RLE X 10 RLE X 10 RLE X 10 RLE  X 10 RLE X 10 RLE   Ant step ups X 10 BLE X 10 BLE X 10 BLE X 10 BLE X 10 BLE X 10 BLE x10 BLE X 10 BLE X 10 RLE   Lat step ups X 10 BLE X 10 BLE X 10 BLE X 10 BLE X 10 BLE X 10 BLE x10 BLE  X 10 BLE X 10 RLE   Ant heel tap X 10 BLE X 10 BLE X 10 BLE X 10 BLE X 10 BLE X 10 BLE x10 BLE X 10 BLE X 10 RLE   QS X 15 X 15 X 15 X 15 X 15 X 15 20x B X 15 X 15   SAQ X 10 1.5# X 10 1.5# X 10 2# X 10 2.5# X 10 2.5# X 10 3# 20x 4# B 20x 4# 20x 4#   SLR X 10 1.5# X 10 1.5# X 10 2# X 10 2.5# X 10 2.5# X 10 3#  20x 4# 20x 4#   Heel slides X 15           Hip abd X 15           Hip add with ball squeeze X 15           Standing ab/add  X 10 1.5# X 10 2# X 10 2.5# X 10 2.5# X 10 3# x15 B 4# 15 x 4# 15 x 4#   Standing ham curls  X 10 1.5# X 10 2# X 10 2.5# X 10 2.5# X 10 3# 20x B 4#  15 x 4# 15 x 4#   Standing marching  X 10 1.5# X 10 2# X 10 2.5# X 10 2.5# X 10 3# 15x B 4# 15 x 4# 15 x 4#   Stand heel/toe  X 10 1.5# X 10 2# X 10 2.5# X 10 2.5# X 10 3# 20x B 4#  15 x 4# 15 x 4#   Side steps     OTB 45'x 4 OTB 45'x 4  OTB 45'x 4    Lumbar extension       10x standing  5x prone  10x standing   Rolling stool pull alongs        45' x 4      MANUAL THERAPY: (0 minutes): Joint mobilization and Soft tissue mobilization was utilized and necessary because of the patient's restricted joint motion. Patellar mobs  PROM with overpressure  Scar massage    MODALITIES (0 mins) - vaso to R knee in recliner, 34*, medium compression to decrease pain/swelling    Treatment/Session Summary:    · Response to Treatment: Dunia Kim II Demonstrates good improvement in ROM. He demonstrates good understanding and compliance with HEP.   · Communication/Consultation:  ice as needed  · Equipment provided today:  None today  · Recommendations/Intent for next treatment session: Given his obtaining 130 degrees of flexion, patient states he is ready to move to independent HEP.     Total Treatment Billable Duration:  40 minutes therex   PT Patient Time In/Time Out  Time In: 1123  Time Out: 1430  Sherice Mota PT    Future Appointments   Date Time Provider Jordan Dulce   2/10/2022 10:30 AM MD RICHARD Rick POAI POA   5/18/2022  9:40 AM Jose Arechiga NP [facility] PSCD PP

## 2021-10-15 NOTE — THERAPY DISCHARGE
[facility] Steven Tatum II  : 1945  Primary: Yunier Mcdonald Medicare Advantage  Secondary:  [address]  at [facility]  [address], [place] [address]  [phone]   [phone]        OUTPATIENT PHYSICAL [facility] 10/14/2021    ICD-10: Treatment Diagnosis: Pain in joint,right knee (M25.561)  Stiffness in joint, right knee (M25.661)  Effusion of joint, right knee (M25.461)  Precautions/Allergies:   Codeine, Lisinopril, and Ultram [tramadol]   TREATMENT PLAN:  Effective Dates: 2021 TO 10/30/2021 (60 days). Frequency/Duration: 2 times a week for 60 Day(s)     MEDICAL/REFERRING DIAGNOSIS:  Presence of right artificial knee joint [Z96.651]   DATE OF ONSET: Surgery Date: 21  REFERRING PHYSICIAN: Juanita Biggs MD MD Orders: Edison Rivera and Treat  Return MD Appointment: 21   ATTENDANCE: As of 10/14/2021, Michell Obrien II has attended 10 out of 14 scheduled visits, with 4 cancellation(s) and 0 no shows. INITIAL ASSESSMENT:  Mr. Steven Tatum presents with pain, swelling and joint stiffness following right TKA 21. Incision is clean with steristrips in place. Patient is ambulating with a rolling walker with mild antalgic gait. ROM is limited to 3 to 110 degrees . Pt may benefit from PT to address the following problem list.    DISCHARGE: Michell Obrien II has met goal of 130 degrees of knee flexion and is ambulating without assistive device. His pain is mild. His KOOS Jr score improved from 14 to 5. He has demonstrated good understanding of and compliance with his HEP. Discharge from active PT at this time. Patient is encouraged to continue HEP. PROBLEM LIST (Impacting functional limitations):  1. Decreased Strength  2. Decreased ADL/Functional Activities  3. Decreased Ambulation Ability/Technique  4. Increased Pain  5. Decreased Flexibility/Joint Mobility  6. Edema/Girth INTERVENTIONS PLANNED:  1. Cryotherapy  2.  Electrical Stimulation  3. Gait Training  4. Home Exercise Program (HEP)  5. Manual Therapy  6. Therapeutic Exercise/Strengthening     GOALS: (Goals have been discussed and agreed upon with patient.)  Short-Term Functional Goals: Time Frame: 4 weeks  7. Independent in initial HEP  Goal Met 10-14-21  8. Increase ROM to 0-125 Goal Met 10-14-21  9. Ambulation without assistive device. Goal Met 10-14-21  10. Pain below 2/10  Discharge Goals: Time Frame: 6 weeks  7. Independent in advanced HEP Goal Met 10-14-21  8. ROM 0-130 Goal Met 10-14-21  9. Strength 5/5 Goal Met 10-14-21  10. Minimal edema Goal Met 10-14-21  11. Return to full ADL's Goal Met 10-14-21  CLINICAL DECISION MAKING:   ROM:            Knee ROM  DATE  8-30-21 DATE  9-9-21  DATE  9-27-21  DATE  10-14-21   flexion R:110  L: R:120  L:  A: 125   P:130 A: 130   extension R:3 lag  L: R:3 lag  L:  A:-3   P:0 A:2  P:0      Outcome Measure: Tool Used: Knee injury and Osteoarthritis Outcome Score for Joint Replacement (KOOS, JR)  Score:  Initial: 14 (Interval: 52.465) 8/30/2021 Most Recent: 5 ( interval: 73.342)  10-14-21   Interpretation of Score: The KOOS, JR contains 7 items from the original KOOS survey. Items are coded from 0 to 4, none to extreme respectively. Tana Bears is scored by summing the raw response (range 0-28) and then converting it to an interval score using the table provided below. The interval score ranges from 0 to 100 where 0 represents total knee disability and 100 represents perfect knee health. PT Patient Time In/Time Out  Time In: 1345  Time Out: 1430  Rehabilitation Potential For Stated Goals: Good  Regarding Renee Hernández II's therapy, I certify that the treatment plan above will be carried out by a therapist or under their direction.   Thank you for this referral,  Lucy Cheney, PT

## 2021-12-21 ENCOUNTER — HOSPITAL ENCOUNTER (OUTPATIENT)
Dept: LAB | Age: 76
Discharge: HOME | End: 2021-12-21

## 2021-12-21 PROCEDURE — 88305 TISSUE EXAM BY PATHOLOGIST: CPT

## 2022-03-18 PROBLEM — R10.13 DYSPEPSIA: Status: ACTIVE | Noted: 2018-07-18

## 2022-03-18 PROBLEM — M53.86 SCIATICA ASSOCIATED WITH DISORDER OF LUMBAR SPINE: Status: ACTIVE | Noted: 2020-08-20

## 2022-03-18 PROBLEM — M17.11 OSTEOARTHRITIS OF RIGHT KNEE: Status: ACTIVE | Noted: 2021-08-09

## 2022-03-19 PROBLEM — M51.369 DDD (DEGENERATIVE DISC DISEASE), LUMBAR: Status: ACTIVE | Noted: 2018-07-05

## 2022-03-19 PROBLEM — M51.36 DDD (DEGENERATIVE DISC DISEASE), LUMBAR: Status: ACTIVE | Noted: 2018-07-05

## 2022-03-19 PROBLEM — Z96.651 STATUS POST RIGHT KNEE REPLACEMENT: Status: ACTIVE | Noted: 2021-08-09

## 2022-03-19 PROBLEM — G89.4 CHRONIC PAIN SYNDROME: Status: ACTIVE | Noted: 2018-07-05

## 2022-03-19 PROBLEM — R73.01 IMPAIRED FASTING GLUCOSE: Status: ACTIVE | Noted: 2017-07-12

## 2022-08-11 ENCOUNTER — OFFICE VISIT (OUTPATIENT)
Dept: ORTHOPEDIC SURGERY | Age: 77
End: 2022-08-11
Payer: MEDICARE

## 2022-08-11 DIAGNOSIS — Z96.651 STATUS POST TOTAL KNEE REPLACEMENT, RIGHT: Primary | ICD-10-CM

## 2022-08-11 PROCEDURE — 99213 OFFICE O/P EST LOW 20 MIN: CPT | Performed by: ORTHOPAEDIC SURGERY

## 2022-08-11 PROCEDURE — 1123F ACP DISCUSS/DSCN MKR DOCD: CPT | Performed by: ORTHOPAEDIC SURGERY

## 2022-08-11 NOTE — PROGRESS NOTES
Post-op TKA Visit      08/11/22     Allergies   Allergen Reactions    Codeine Other (See Comments)     Flushed and weird feeling    Lisinopril Other (See Comments)    Tramadol Itching, Nausea Only and Other (See Comments)     Current Outpatient Medications on File Prior to Visit   Medication Sig Dispense Refill    acetaminophen (TYLENOL) 500 MG tablet Take 1,000 mg by mouth every 6 hours as needed      allopurinol (ZYLOPRIM) 300 MG tablet The details of the medication are not available because there are pending changes by a home health clinician. atorvastatin (LIPITOR) 10 MG tablet Take 10 mg by mouth      cetirizine (ZYRTEC) 10 MG tablet Take 10 mg by mouth 2 times daily      famotidine (PEPCID) 20 MG tablet Take 20 mg by mouth 2 times daily      fluticasone (CUTIVATE) 0.05 % cream Apply 1 g topically 2 times daily      gabapentin (NEURONTIN) 300 MG capsule Take 300 mg by mouth 3 times daily. losartan (COZAAR) 100 MG tablet TAKE 1 TABLET BY MOUTH EVERY DAY      metFORMIN (GLUCOPHAGE) 500 MG tablet Take 500 mg by mouth 2 times daily (with meals)      pantoprazole (PROTONIX) 20 MG tablet Take 20 mg by mouth daily as needed      potassium chloride (KLOR-CON M) 10 MEQ extended release tablet Take 20 mEq by mouth daily      torsemide (DEMADEX) 10 MG tablet 1 tab po qam prn edema       No current facility-administered medications on file prior to visit. 1 yr Status Post right TKA     History: The patient returns today for post-op visit following right TKA. Their pain is improving. They are ambulating with no  walking aid. They have completed home physical therapy. He has resumed normal activity. Still some weakness going up steps, but no knee pain. Physical Exam:  The patient's incision is well healed. There is no swelling and  warmth. ROM is 0 to 120 degrees. There is no M/L or A/P instability. The calf is soft and non-tender. Neurologic and vascular exams are intact. Ambulates well.  No reproduceable crepitus of patella. X-Rays: AP and Lateral x-rays of right reveals a hybrid TKA, Honeydew prosthesis. There is no patella arthroplasty. There is good alignment and good position of the components. X-Ray Diagnosis: Satisfactory appearance right TKA    Disposition: The patient is doing well following right TKA. They will continue physical therapy exercises. The patient was advised about fall precautions and dental prophylaxis. The patient will follow up as scheduled or sooner if needed. Follow up in 4 years.

## 2023-01-12 ENCOUNTER — HOSPITAL ENCOUNTER (OUTPATIENT)
Dept: GENERAL RADIOLOGY | Age: 78
Discharge: HOME | End: 2023-01-12
Payer: MEDICARE

## 2023-01-12 DIAGNOSIS — M54.50 LOW BACK PAIN, UNSPECIFIED BACK PAIN LATERALITY, UNSPECIFIED CHRONICITY, UNSPECIFIED WHETHER SCIATICA PRESENT: ICD-10-CM

## 2023-01-12 PROCEDURE — 72114 X-RAY EXAM L-S SPINE BENDING: CPT

## 2023-05-15 NOTE — PROGRESS NOTES
[id number]  Phone: [phone]     Primary Insurance: Payor: Valeri Plata / Plan: Fausto Hdz PPO / Product Type: Medicare /   Subscriber ID: [id number] - (Medicare Managed)      AMB Supply Order  Order Details     DME Location: [facility]   Order Date: 5/16/2023   The primary encounter diagnosis was Obstructive sleep apnea (adult) (pediatric). Diagnoses of Periodic limb movement disorder and Morbid obesity with BMI of 40.0-44.9, adult [facility]) were also pertinent to this visit.              (  X   )Supplies Needed       autoCPAP Machine   (     ) CPAP Unit  (     ) Auto CPAP Unit  (     ) BiLevel Unit  (     ) Auto BiLevel Unit  (     ) ASV        (     ) Bilevel ST      Length of need: 12 months    Pressure:  13 cmH20  EPR:     Starting Ramp Pressure:   cm H20  Ramp Time: min        Patient had a diagnostic Apnea Hypopnea Index (AHI) of :    *SUPPLIES* Replace all as needed, or per coverage guidelines     Masks Type:  (    ) -Full Face Mask (1 per 3 mon)  (    ) -Full Mask (1 per month) Interface/Cushion      ( x ) -Nasal Mask (1 per 3 mon)  (   ) - Nasal Mask (1 per month) Interface/Cushion  (  x   ) -Pillow (2 per mon)  (     ) -Usfjbdpsm (1 per 6 mon)            Other Supplies:    (   X  )-Itwzxqaj (1 per 6 mon)  (   X  )-Bzvydm Tubing (1 per 3 mon)  (   X  )- Disposable Filter (2 per mon)  (   x  )-Xfgxee Humidifier (1 per year)     (  x   )-Kfpiwword (sometimes used with Full Face Mask) (1 per 6 mos)  (    )-Tubing-without heat (1 per 3 mos)  (     )-Non-Disposable Filter (1 per 6 mos)  (  x   )-Water Chamber (1 per 6 mos)  (     )-Humidifier non-heated (1 per 5 yrs)      Signed Date: 5/16/2023  Electronically Signed By: FROY Alvarez - CNP  Electronically Dated:  5/16/2023         Collaborating Physician: Dr. Jerome Torres    Over 50% of today's office visit was spent in face to face time

## 2023-05-16 ENCOUNTER — OFFICE VISIT (OUTPATIENT)
Dept: SLEEP MEDICINE | Age: 78
End: 2023-05-16
Payer: MEDICARE

## 2023-05-16 VITALS
RESPIRATION RATE: 16 BRPM | HEIGHT: 67 IN | SYSTOLIC BLOOD PRESSURE: 120 MMHG | BODY MASS INDEX: 44.42 KG/M2 | TEMPERATURE: 96.7 F | DIASTOLIC BLOOD PRESSURE: 62 MMHG | HEART RATE: 58 BPM | OXYGEN SATURATION: 97 % | WEIGHT: 283 LBS

## 2023-05-16 DIAGNOSIS — E66.01 MORBID OBESITY WITH BMI OF 40.0-44.9, ADULT (HCC): ICD-10-CM

## 2023-05-16 DIAGNOSIS — G47.61 PERIODIC LIMB MOVEMENT DISORDER: ICD-10-CM

## 2023-05-16 DIAGNOSIS — G47.33 OBSTRUCTIVE SLEEP APNEA (ADULT) (PEDIATRIC): Primary | ICD-10-CM

## 2023-05-16 PROCEDURE — 99213 OFFICE O/P EST LOW 20 MIN: CPT | Performed by: STUDENT IN AN ORGANIZED HEALTH CARE EDUCATION/TRAINING PROGRAM

## 2023-05-16 PROCEDURE — 3074F SYST BP LT 130 MM HG: CPT | Performed by: STUDENT IN AN ORGANIZED HEALTH CARE EDUCATION/TRAINING PROGRAM

## 2023-05-16 PROCEDURE — 3078F DIAST BP <80 MM HG: CPT | Performed by: STUDENT IN AN ORGANIZED HEALTH CARE EDUCATION/TRAINING PROGRAM

## 2023-05-16 PROCEDURE — 1123F ACP DISCUSS/DSCN MKR DOCD: CPT | Performed by: STUDENT IN AN ORGANIZED HEALTH CARE EDUCATION/TRAINING PROGRAM

## 2023-05-16 RX ORDER — TAMSULOSIN HYDROCHLORIDE 0.4 MG/1
CAPSULE ORAL
COMMUNITY
Start: 2023-05-10

## 2023-05-16 ASSESSMENT — SLEEP AND FATIGUE QUESTIONNAIRES
HOW LIKELY ARE YOU TO NOD OFF OR FALL ASLEEP WHILE SITTING QUIETLY AFTER LUNCH WITHOUT ALCOHOL: 1
HOW LIKELY ARE YOU TO NOD OFF OR FALL ASLEEP WHILE SITTING AND TALKING TO SOMEONE: 0
HOW LIKELY ARE YOU TO NOD OFF OR FALL ASLEEP IN A CAR, WHILE STOPPED FOR A FEW MINUTES IN TRAFFIC: 0
HOW LIKELY ARE YOU TO NOD OFF OR FALL ASLEEP WHILE SITTING AND READING: 1
HOW LIKELY ARE YOU TO NOD OFF OR FALL ASLEEP WHILE SITTING INACTIVE IN A PUBLIC PLACE: 1
HOW LIKELY ARE YOU TO NOD OFF OR FALL ASLEEP WHILE LYING DOWN TO REST IN THE AFTERNOON WHEN CIRCUMSTANCES PERMIT: 0
HOW LIKELY ARE YOU TO NOD OFF OR FALL ASLEEP WHILE WATCHING TV: 2
HOW LIKELY ARE YOU TO NOD OFF OR FALL ASLEEP WHEN YOU ARE A PASSENGER IN A CAR FOR AN HOUR WITHOUT A BREAK: 0
ESS TOTAL SCORE: 5

## 2023-07-18 ENCOUNTER — INITIAL CONSULT (OUTPATIENT)
Age: 78
End: 2023-07-18
Payer: MEDICARE

## 2023-07-18 VITALS
SYSTOLIC BLOOD PRESSURE: 120 MMHG | DIASTOLIC BLOOD PRESSURE: 75 MMHG | HEART RATE: 60 BPM | HEIGHT: 68 IN | WEIGHT: 277 LBS | BODY MASS INDEX: 41.98 KG/M2

## 2023-07-18 DIAGNOSIS — I44.0 FIRST DEGREE AV BLOCK: ICD-10-CM

## 2023-07-18 DIAGNOSIS — G47.33 OSA (OBSTRUCTIVE SLEEP APNEA): ICD-10-CM

## 2023-07-18 DIAGNOSIS — I10 ESSENTIAL HYPERTENSION: ICD-10-CM

## 2023-07-18 DIAGNOSIS — R06.09 DYSPNEA ON EXERTION: Primary | ICD-10-CM

## 2023-07-18 DIAGNOSIS — E78.2 MIXED HYPERLIPIDEMIA: ICD-10-CM

## 2023-07-18 PROCEDURE — 3074F SYST BP LT 130 MM HG: CPT | Performed by: INTERNAL MEDICINE

## 2023-07-18 PROCEDURE — 99204 OFFICE O/P NEW MOD 45 MIN: CPT | Performed by: INTERNAL MEDICINE

## 2023-07-18 PROCEDURE — 1123F ACP DISCUSS/DSCN MKR DOCD: CPT | Performed by: INTERNAL MEDICINE

## 2023-07-18 PROCEDURE — 3078F DIAST BP <80 MM HG: CPT | Performed by: INTERNAL MEDICINE

## 2023-07-18 RX ORDER — PREGABALIN 50 MG/1
50 CAPSULE ORAL 3 TIMES DAILY
COMMUNITY

## 2023-07-18 RX ORDER — HYDROCODONE BITARTRATE AND ACETAMINOPHEN 5; 325 MG/1; MG/1
TABLET ORAL 2 TIMES DAILY
COMMUNITY

## 2023-07-18 ASSESSMENT — ENCOUNTER SYMPTOMS
ABDOMINAL PAIN: 0
HEMATOCHEZIA: 0
DOUBLE VISION: 0
EYE REDNESS: 0
HEMOPTYSIS: 0
WHEEZING: 0
STRIDOR: 0
HEMATEMESIS: 0
HOARSE VOICE: 0

## 2023-07-18 NOTE — PROGRESS NOTES
acute distress, obese  HEENT: Head is normocephalic, atraumatic, pupils are equal bilaterally, throat appears to be clear  Neck: No significant jugular venous distention no cervical bruits  Cardiovascular: S1 and S2 heard, regular rate and rhythm, no significant murmurs rubs or gallops. Respiratory: Clear to auscultation bilaterally with no adventitious sounds, respirations are normal  Abdomen: Soft, nontender, nondistended, obese, bowel sounds present. Extremities: No cyanosis clubbing , trace bilateral pitting ankle edema noted  Peripheral pulses: Bilateral radial artery pulses are palpated. Bilateral pedal pulses are well felt. Neuro: No facial droop and no gross focal motor deficits  Lymphatic: No significant cervical lymphadenopathy noted. Musculoskeletal: No significant redness or swelling noted in all exposed joints. Skin: No significant rashes noted the of the exposed regions. Medical problems and test results were reviewed with the patient today. No results found for this or any previous visit (from the past 672 hour(s)).   No results found for: CHOL, CHOLPOCT, CHOLX, CHLST, CHOLV, HDL, HDLPOC, HDLC, LDL, LDLC, VLDLC, VLDL, TGLX, TRIGL,hemoglobin, basic metabolic panel, No results found for: TSH, TSH2, TSH3 ,  Lab Results   Component Value Date/Time     07/26/2021 07:38 AM    K 3.5 07/26/2021 07:38 AM     07/26/2021 07:38 AM    CO2 28 07/26/2021 07:38 AM    BUN 23 07/26/2021 07:38 AM    GFRAA 56 07/26/2021 07:38 AM      No results found for: LDLCALC, LDLCHOLESTEROL, LDLDIRECT   Lab Results   Component Value Date    CREATININE 1.56 (H) 07/26/2021      Lab Results   Component Value Date    HGB 12.7 (L) 08/10/2021      Lab Results   Component Value Date     07/26/2021        -EKG from 6/6/2023 showed sinus bradycardia with first-degree AV block with a heart rate of 50 bpm, MO interval of 212 ms, otherwise normal-personally reviewed with him    ASSESSMENT and PLAN    Dyspnea

## 2023-08-23 ENCOUNTER — OFFICE VISIT (OUTPATIENT)
Age: 78
End: 2023-08-23
Payer: MEDICARE

## 2023-08-23 VITALS
BODY MASS INDEX: 43.24 KG/M2 | HEART RATE: 70 BPM | SYSTOLIC BLOOD PRESSURE: 110 MMHG | WEIGHT: 275.5 LBS | HEIGHT: 67 IN | DIASTOLIC BLOOD PRESSURE: 66 MMHG

## 2023-08-23 DIAGNOSIS — R06.09 DYSPNEA ON EXERTION: Primary | ICD-10-CM

## 2023-08-23 DIAGNOSIS — Z01.810 PREOPERATIVE CARDIOVASCULAR EXAMINATION: ICD-10-CM

## 2023-08-23 DIAGNOSIS — E78.2 MIXED HYPERLIPIDEMIA: ICD-10-CM

## 2023-08-23 DIAGNOSIS — I10 ESSENTIAL HYPERTENSION: ICD-10-CM

## 2023-08-23 PROCEDURE — 3074F SYST BP LT 130 MM HG: CPT | Performed by: INTERNAL MEDICINE

## 2023-08-23 PROCEDURE — 1123F ACP DISCUSS/DSCN MKR DOCD: CPT | Performed by: INTERNAL MEDICINE

## 2023-08-23 PROCEDURE — 3078F DIAST BP <80 MM HG: CPT | Performed by: INTERNAL MEDICINE

## 2023-08-23 PROCEDURE — 99214 OFFICE O/P EST MOD 30 MIN: CPT | Performed by: INTERNAL MEDICINE

## 2023-08-23 ASSESSMENT — ENCOUNTER SYMPTOMS
DOUBLE VISION: 0
EYE REDNESS: 0
HEMOPTYSIS: 0
WHEEZING: 0
HEMATOCHEZIA: 0
HEMATEMESIS: 0
STRIDOR: 0
ABDOMINAL PAIN: 0
HOARSE VOICE: 0

## 2023-08-23 NOTE — PATIENT INSTRUCTIONS
-Your stress test and echocardiogram results were normal and encouraging. Try to cut back your torsemide to just 4 days a week to try and give your kidneys a break in between. Continue to drink a lot of water, hydrate well, exercise regularly and aim for about 25 pound gradual but consistent weight loss which should help you in the long run.

## 2023-08-23 NOTE — PROGRESS NOTES
[facility] CARDIOLOGY  [address] [facility], [place]  PHONE: [phone]          23    NAME:  Kathrin Bird II  : 1945  MRN: [id number]         SUBJECTIVE:   Kathrin Bird II is a 70 y.o. male seen for a visit regarding the following:     Chief Complaint   Patient presents with    Results     Nuke and Echo    Hyperlipidemia    Hypertension           HPI:    Cardio problem list:  1. Dyspnea on exertion  -Echo from 2023 showed an EF of 55 to 60% with no regional wall motion abnormalities, mild concentric LVH  -Nuclear stress test from 2023 came back with an EF at 70%, normal perfusion,  2. Hypertension  3. Hyperlipidemia  4. Type 2 diabetes mellitus- prediabetic  5. Obstructive sleep apnea- on CPAP  6. Morbid obesity  7. First-degree AV block with sinus bradycardia  8. Chronic back pain - spinal stenosis    Dear Dr. Zaira Rondon,  I saw Mr. Jason Gaona who is a pleasant 72-year-old gentleman in cardiovascular  Follow-up for dyspnea on exertion with known hypertension and hyperlipidemia, type 2 diabetes mellitus and obesity has additional risk factors besides age. He had a recent EKG that showed sinus bradycardia with first-degree AV block. We last met with him a month ago at which time for his dyspnea on exertion and chest tightness we got him through a nuclear stress test which showed no inducible ischemia and an echo which showed preserved LV systolic function with no significant valvular disease. Dyspnea on exertion: Overall better since he last met with us. He is completely euvolemic on exam.  No significant anginal type chest discomfort in any way. Reassured that his nuclear stress test and echo looked good. No complaints of any asthma, never smoked much in his life before. No COPD.     Hypertension: Denies headaches blurry vision and remains compliant on his losartan 50 mg daily    Hyperlipidemia-remains on low-dose atorvastatin therapy with

## 2024-03-07 ENCOUNTER — HOSPITAL ENCOUNTER (OUTPATIENT)
Dept: ULTRASOUND IMAGING | Age: 79
Discharge: HOME | End: 2024-03-07
Attending: FAMILY MEDICINE
Payer: MEDICARE

## 2024-03-07 DIAGNOSIS — R09.89 ABNORMAL CHEST SOUNDS: ICD-10-CM

## 2024-03-07 PROCEDURE — 93922 UPR/L XTREMITY ART 2 LEVELS: CPT

## 2024-06-10 NOTE — PROGRESS NOTES
[facility]  [address]  [place] [address]  [phone]    Patient Name:  Rik Montano II  YOB: 1945      Office Visit 6/13/2024    CHIEF COMPLAINT:    Chief Complaint   Patient presents with    CPAP/BiPAP    Sleep Apnea         HISTORY OF PRESENT ILLNESS:  Patient is seen today for follow up of sleep apnea. Patient's sleep study in 2014 revealed AHI of 58.4 with lowest desaturation 76%. He is prescribed cpap therapy with a humidifier set at 13cm with a nasal pillow mask.  Download reveals 99% compliance over the last 3 months with average nightly use 6 hours and 24 minutes.  AHI 0.8 events per hour.  He continues to use his S9 machine and denies any problems except needing to exchange a gasket in the water chamber.  He states this is the first machine that he has used and does not feel a replacement is needed yet.  He denies any problems with mask or pressure.  He reports a 10 pound weight gain over the last year.  He is more sedentary with back pain and has an upcoming back surgery in the next few weeks.  He remains on Norco and is now on Lyrica instead of gabapentin.  No lower extremity edema managed on torsemide.  He needs a new supply order and this will be done today.    Download          [place] Sleepiness Scale         6/13/2024    10:22 AM 8/6/2023     1:48 PM 5/16/2023     9:07 AM 5/18/2022    10:00 AM   Sleep Medicine   Sitting and reading 2 2 1 1   Watching TV 1 2 2 1   Sitting, inactive in a public place (e.g. a theatre or a meeting) 0 1 1 0   As a passenger in a car for an hour without a break 1 1 0 1   Lying down to rest in the afternoon when circumstances permit 2 2 0 0   Sitting and talking to someone 0 1 0 0   Sitting quietly after a lunch without alcohol 1 1 1 0   In a car, while stopped for a few minutes in traffic 0 1 0 0   [place] Sleepiness Score 7 11 5 3       Past Medical History:   Diagnosis Date    Allergic rhinitis

## 2024-06-13 ENCOUNTER — OFFICE VISIT (OUTPATIENT)
Dept: SLEEP MEDICINE | Age: 79
End: 2024-06-13
Payer: MEDICARE

## 2024-06-13 VITALS
WEIGHT: 285 LBS | SYSTOLIC BLOOD PRESSURE: 137 MMHG | RESPIRATION RATE: 16 BRPM | DIASTOLIC BLOOD PRESSURE: 68 MMHG | HEART RATE: 56 BPM | HEIGHT: 67 IN | TEMPERATURE: 97.2 F | BODY MASS INDEX: 44.73 KG/M2 | OXYGEN SATURATION: 94 %

## 2024-06-13 DIAGNOSIS — G47.33 OBSTRUCTIVE SLEEP APNEA (ADULT) (PEDIATRIC): Primary | ICD-10-CM

## 2024-06-13 DIAGNOSIS — E66.01 MORBID OBESITY WITH BMI OF 40.0-44.9, ADULT (HCC): ICD-10-CM

## 2024-06-13 PROCEDURE — 99213 OFFICE O/P EST LOW 20 MIN: CPT | Performed by: NURSE PRACTITIONER

## 2024-06-13 PROCEDURE — 3075F SYST BP GE 130 - 139MM HG: CPT | Performed by: NURSE PRACTITIONER

## 2024-06-13 PROCEDURE — 3078F DIAST BP <80 MM HG: CPT | Performed by: NURSE PRACTITIONER

## 2024-06-13 PROCEDURE — 1123F ACP DISCUSS/DSCN MKR DOCD: CPT | Performed by: NURSE PRACTITIONER

## 2024-06-13 ASSESSMENT — SLEEP AND FATIGUE QUESTIONNAIRES
HOW LIKELY ARE YOU TO NOD OFF OR FALL ASLEEP WHILE LYING DOWN TO REST IN THE AFTERNOON WHEN CIRCUMSTANCES PERMIT: MODERATE CHANCE OF DOZING
HOW LIKELY ARE YOU TO NOD OFF OR FALL ASLEEP WHILE SITTING AND READING: MODERATE CHANCE OF DOZING
HOW LIKELY ARE YOU TO NOD OFF OR FALL ASLEEP WHILE SITTING AND TALKING TO SOMEONE: WOULD NEVER DOZE
HOW LIKELY ARE YOU TO NOD OFF OR FALL ASLEEP WHILE SITTING QUIETLY AFTER LUNCH WITHOUT ALCOHOL: SLIGHT CHANCE OF DOZING
HOW LIKELY ARE YOU TO NOD OFF OR FALL ASLEEP IN A CAR, WHILE STOPPED FOR A FEW MINUTES IN TRAFFIC: WOULD NEVER DOZE
HOW LIKELY ARE YOU TO NOD OFF OR FALL ASLEEP WHEN YOU ARE A PASSENGER IN A CAR FOR AN HOUR WITHOUT A BREAK: SLIGHT CHANCE OF DOZING
HOW LIKELY ARE YOU TO NOD OFF OR FALL ASLEEP WHILE SITTING INACTIVE IN A PUBLIC PLACE: WOULD NEVER DOZE
ESS TOTAL SCORE: 7
HOW LIKELY ARE YOU TO NOD OFF OR FALL ASLEEP WHILE WATCHING TV: SLIGHT CHANCE OF DOZING

## 2025-06-16 NOTE — PROGRESS NOTES
[facility]  [address]  [place] [address]  [phone]    Patient Name:  Rik Montano II  YOB: 1945      Office Visit 6/17/2025    The patient (or guardian, if applicable) and other individuals in attendance with the patient were advised that Artificial Intelligence will be utilized during this visit to record, process the conversation to generate a clinical note, and support improvement of the AI technology. The patient (or guardian, if applicable) and other individuals in attendance at the appointment consented to the use of AI, including the recording.        CHIEF COMPLAINT:    Chief Complaint   Patient presents with    Follow-up    Sleep Apnea         History of Present Illness  The patient is a 80-year-old male who presents for evaluation of sleep apnea.    He underwent a sleep study on 06/08/2014, which revealed an Apnea-Hypopnea Index (AHI) of 58.4 events per hour and a minimum oxygen saturation of 76 percent. He was subsequently initiated on CPAP therapy with a pressure setting of 13, which he continues to tolerate well.  Most recent download reveals 96% compliance over the past year with average nightly use 6 hours and 7 minutes.  AHI 0.4 events per hour.  He has an S9 machine which is giving him the message that motor life is exceeded.  He wants to wait until the machine no longer works to obtain a new one.    He uses a nasal mask and reports occasional dislodgement of the mask during sleep, as noted by his wife. Despite this, he finds the mask effective. He reports unintentional daytime napping, often falling asleep while watching television with his wife. His sleep pattern is characterized by initial good sleep from 10:30 PM or 11:30 PM until approximately 2:30 AM or 3:30 AM, after which he experiences awakenings, sometimes due to leg pain. He rarely needs to use the bathroom at night. He typically wakes up around 6:00 AM. He has an upcoming

## 2025-06-17 ENCOUNTER — OFFICE VISIT (OUTPATIENT)
Dept: SLEEP MEDICINE | Age: 80
End: 2025-06-17
Payer: MEDICARE

## 2025-06-17 VITALS
DIASTOLIC BLOOD PRESSURE: 76 MMHG | HEIGHT: 68 IN | RESPIRATION RATE: 14 BRPM | HEART RATE: 89 BPM | SYSTOLIC BLOOD PRESSURE: 116 MMHG | WEIGHT: 234 LBS | OXYGEN SATURATION: 97 % | BODY MASS INDEX: 35.46 KG/M2

## 2025-06-17 DIAGNOSIS — G47.00 PERSISTENT DISORDER OF INITIATING OR MAINTAINING SLEEP: ICD-10-CM

## 2025-06-17 DIAGNOSIS — E66.01 MORBID OBESITY WITH BMI OF 40.0-44.9, ADULT (HCC): ICD-10-CM

## 2025-06-17 DIAGNOSIS — M54.30 SCIATICA, UNSPECIFIED LATERALITY: ICD-10-CM

## 2025-06-17 DIAGNOSIS — G47.10 HYPERSOMNIA: ICD-10-CM

## 2025-06-17 DIAGNOSIS — G47.33 OBSTRUCTIVE SLEEP APNEA (ADULT) (PEDIATRIC): Primary | ICD-10-CM

## 2025-06-17 PROCEDURE — 1123F ACP DISCUSS/DSCN MKR DOCD: CPT | Performed by: NURSE PRACTITIONER

## 2025-06-17 PROCEDURE — 99214 OFFICE O/P EST MOD 30 MIN: CPT | Performed by: NURSE PRACTITIONER

## 2025-06-17 PROCEDURE — 1160F RVW MEDS BY RX/DR IN RCRD: CPT | Performed by: NURSE PRACTITIONER

## 2025-06-17 PROCEDURE — 3078F DIAST BP <80 MM HG: CPT | Performed by: NURSE PRACTITIONER

## 2025-06-17 PROCEDURE — 3074F SYST BP LT 130 MM HG: CPT | Performed by: NURSE PRACTITIONER

## 2025-06-17 PROCEDURE — 1159F MED LIST DOCD IN RCRD: CPT | Performed by: NURSE PRACTITIONER

## 2025-06-17 ASSESSMENT — SLEEP AND FATIGUE QUESTIONNAIRES
HOW LIKELY ARE YOU TO NOD OFF OR FALL ASLEEP WHEN YOU ARE A PASSENGER IN A CAR FOR AN HOUR WITHOUT A BREAK: HIGH CHANCE OF DOZING
HOW LIKELY ARE YOU TO NOD OFF OR FALL ASLEEP WHILE SITTING AND READING: HIGH CHANCE OF DOZING
HOW LIKELY ARE YOU TO NOD OFF OR FALL ASLEEP WHILE SITTING QUIETLY AFTER LUNCH WITHOUT ALCOHOL: WOULD NEVER DOZE
HOW LIKELY ARE YOU TO NOD OFF OR FALL ASLEEP WHILE WATCHING TV: HIGH CHANCE OF DOZING
HOW LIKELY ARE YOU TO NOD OFF OR FALL ASLEEP WHILE LYING DOWN TO REST IN THE AFTERNOON WHEN CIRCUMSTANCES PERMIT: HIGH CHANCE OF DOZING
HOW LIKELY ARE YOU TO NOD OFF OR FALL ASLEEP IN A CAR, WHILE STOPPED FOR A FEW MINUTES IN TRAFFIC: WOULD NEVER DOZE
HOW LIKELY ARE YOU TO NOD OFF OR FALL ASLEEP WHILE SITTING INACTIVE IN A PUBLIC PLACE: MODERATE CHANCE OF DOZING
ESS TOTAL SCORE: 15
HOW LIKELY ARE YOU TO NOD OFF OR FALL ASLEEP WHILE SITTING AND TALKING TO SOMEONE: SLIGHT CHANCE OF DOZING

## (undated) DEVICE — SUTURE 2 0 STRATAFIX SYMMETRIC PDS + 60CM CT 1 SXPP1A439

## (undated) DEVICE — Device

## (undated) DEVICE — TOTAL KNEE DR JENNINGS: Brand: MEDLINE INDUSTRIES, INC.

## (undated) DEVICE — DISPOSABLE DRAPE, STERILE, FOR A CDS-3060 5 FOOT TABLE: Brand: PEDIGO PRODUCTS, INC.

## (undated) DEVICE — 3M™ STERI-DRAPE™ INSTRUMENT POUCH 1018: Brand: STERI-DRAPE™

## (undated) DEVICE — GUIDEPIN ORTHOPEDIC NAVIGATION 4X140 MM 2P SCREW STRL

## (undated) DEVICE — X-LARGE COTTON GLOVE: Brand: DEROYAL

## (undated) DEVICE — STERILE PRESSURE PROTECTOR PAD® FOR DE MAYO UNIVERSAL DISTRACTOR® (10/CASE): Brand: DE MAYO UNIVERSAL DISTRACTOR®

## (undated) DEVICE — Z DISCONTINUED USE 2744636  DRESSING AQUACEL 14 IN ALG W3.5XL14IN POLYUR FLM CVR W/ HYDRCOLL

## (undated) DEVICE — BIPOLAR SEALER 23-112-1 AQM 6.0: Brand: AQUAMANTYS ®

## (undated) DEVICE — T4 HOOD

## (undated) DEVICE — SUT ETHBND 2 30IN LR DA GRN --

## (undated) DEVICE — SYR 50ML LR LCK 1ML GRAD NSAF --

## (undated) DEVICE — KIT INT FIX FEM TIB CKPT MAKOPLASTY

## (undated) DEVICE — (D)PREP SKN CHLRAPRP APPL 26ML -- CONVERT TO ITEM 371833

## (undated) DEVICE — BLADE SURG SAW STD S STL OSC W/ SERR EDGE DISP

## (undated) DEVICE — DRAPE,TOP,102X53,STERILE: Brand: MEDLINE

## (undated) DEVICE — HANDPIECE SET WITH COAXIAL HIGH FLOW TIP AND SUCTION TUBE: Brand: INTERPULSE

## (undated) DEVICE — SOLUTION IRRIG 3000ML 0.9% SOD CHL FLX CONT 0797208] ICU MEDICAL INC]

## (undated) DEVICE — SOLUTION IV 250ML 0.9% SOD CHL CLR INJ FLX BG CONT PRT CLSR

## (undated) DEVICE — SUTURE ABSRB X-1 REV CUT 1/2 CIR 22MM UD BRAID 27IN SZ 3-0 J458H

## (undated) DEVICE — CORD RETRCT SIL

## (undated) DEVICE — SOLUTION IV 1000ML 0.9% SOD CHL

## (undated) DEVICE — GUIDEPIN ORTHOPEDIC NAVIGATION 4X110 MM 2P SCREW STRL

## (undated) DEVICE — CURETTE BNE CEM 10IN DISP --

## (undated) DEVICE — KIT PROC KNE TRACKING PK/1 -- VIZADISC MAKO

## (undated) DEVICE — TRAY PREP DRY W/ PREM GLV 2 APPL 6 SPNG 2 UNDPD 1 OVERWRAP

## (undated) DEVICE — KIT DRP FOR RIO ROBOTIC ARM ASST SYS